# Patient Record
Sex: MALE | Race: WHITE | Employment: OTHER | URBAN - METROPOLITAN AREA
[De-identification: names, ages, dates, MRNs, and addresses within clinical notes are randomized per-mention and may not be internally consistent; named-entity substitution may affect disease eponyms.]

---

## 2020-11-18 ENCOUNTER — TELEPHONE (OUTPATIENT)
Dept: INTERNAL MEDICINE CLINIC | Age: 70
End: 2020-11-18

## 2020-11-18 NOTE — TELEPHONE ENCOUNTER
Incoming fax from [facility] order pharmacy attached. Patient has yet to establish care with , refills will be sent if appropriate at time of New Patient visits 11/19/2020.     Finasteride 5mg    Losartan 50mg    Carvedilol 12.5mg    Furosemide 40mg    Warfarin 5mg

## 2020-11-19 ENCOUNTER — OFFICE VISIT (OUTPATIENT)
Dept: INTERNAL MEDICINE CLINIC | Age: 70
End: 2020-11-19
Payer: MEDICARE

## 2020-11-19 VITALS
OXYGEN SATURATION: 99 % | DIASTOLIC BLOOD PRESSURE: 74 MMHG | HEIGHT: 74 IN | SYSTOLIC BLOOD PRESSURE: 132 MMHG | WEIGHT: 227 LBS | TEMPERATURE: 97.3 F | HEART RATE: 86 BPM | BODY MASS INDEX: 29.13 KG/M2

## 2020-11-19 PROBLEM — I48.20 CHRONIC ATRIAL FIBRILLATION (HCC): Status: ACTIVE | Noted: 2020-11-19

## 2020-11-19 PROBLEM — I10 ESSENTIAL HYPERTENSION: Status: ACTIVE | Noted: 2020-11-19

## 2020-11-19 LAB
INR BLD: 2.11 (ref 0.86–1.14)
PROTHROMBIN TIME: 24.7 SEC (ref 10–13.2)

## 2020-11-19 PROCEDURE — G8417 CALC BMI ABV UP PARAM F/U: HCPCS | Performed by: INTERNAL MEDICINE

## 2020-11-19 PROCEDURE — G8427 DOCREV CUR MEDS BY ELIG CLIN: HCPCS | Performed by: INTERNAL MEDICINE

## 2020-11-19 PROCEDURE — 99203 OFFICE O/P NEW LOW 30 MIN: CPT | Performed by: INTERNAL MEDICINE

## 2020-11-19 PROCEDURE — G8484 FLU IMMUNIZE NO ADMIN: HCPCS | Performed by: INTERNAL MEDICINE

## 2020-11-19 PROCEDURE — 3017F COLORECTAL CA SCREEN DOC REV: CPT | Performed by: INTERNAL MEDICINE

## 2020-11-19 PROCEDURE — 36415 COLL VENOUS BLD VENIPUNCTURE: CPT | Performed by: INTERNAL MEDICINE

## 2020-11-19 PROCEDURE — 1123F ACP DISCUSS/DSCN MKR DOCD: CPT | Performed by: INTERNAL MEDICINE

## 2020-11-19 PROCEDURE — 4040F PNEUMOC VAC/ADMIN/RCVD: CPT | Performed by: INTERNAL MEDICINE

## 2020-11-19 PROCEDURE — G0438 PPPS, INITIAL VISIT: HCPCS | Performed by: INTERNAL MEDICINE

## 2020-11-19 PROCEDURE — 1036F TOBACCO NON-USER: CPT | Performed by: INTERNAL MEDICINE

## 2020-11-19 RX ORDER — LOSARTAN POTASSIUM AND HYDROCHLOROTHIAZIDE 25; 100 MG/1; MG/1
1 TABLET ORAL DAILY
Qty: 90 TABLET | Refills: 0 | Status: SHIPPED | OUTPATIENT
Start: 2020-11-19 | End: 2021-01-14 | Stop reason: SDUPTHER

## 2020-11-19 RX ORDER — CARVEDILOL 25 MG/1
25 TABLET ORAL 2 TIMES DAILY
Qty: 1 TABLET | Refills: 0 | COMMUNITY
Start: 2020-11-19 | End: 2020-11-24 | Stop reason: SDUPTHER

## 2020-11-19 RX ORDER — FUROSEMIDE 40 MG/1
40 TABLET ORAL PRN
COMMUNITY
End: 2020-11-24 | Stop reason: SDUPTHER

## 2020-11-19 RX ORDER — LOSARTAN POTASSIUM 50 MG/1
50 TABLET ORAL DAILY
COMMUNITY
End: 2021-01-14 | Stop reason: ALTCHOICE

## 2020-11-19 RX ORDER — LISINOPRIL 40 MG/1
40 TABLET ORAL DAILY
COMMUNITY
End: 2021-01-14 | Stop reason: ALTCHOICE

## 2020-11-19 RX ORDER — FINASTERIDE 5 MG/1
5 TABLET, FILM COATED ORAL DAILY
COMMUNITY
End: 2020-11-24 | Stop reason: SDUPTHER

## 2020-11-19 RX ORDER — WARFARIN SODIUM 5 MG/1
5 TABLET ORAL DAILY
COMMUNITY
End: 2020-11-24 | Stop reason: SDUPTHER

## 2020-11-19 ASSESSMENT — PATIENT HEALTH QUESTIONNAIRE - PHQ9
SUM OF ALL RESPONSES TO PHQ QUESTIONS 1-9: 0
SUM OF ALL RESPONSES TO PHQ9 QUESTIONS 1 & 2: 0
1. LITTLE INTEREST OR PLEASURE IN DOING THINGS: 0
SUM OF ALL RESPONSES TO PHQ QUESTIONS 1-9: 0
SUM OF ALL RESPONSES TO PHQ QUESTIONS 1-9: 0
2. FEELING DOWN, DEPRESSED OR HOPELESS: 0

## 2020-11-19 ASSESSMENT — LIFESTYLE VARIABLES
HOW OFTEN DURING THE LAST YEAR HAVE YOU BEEN UNABLE TO REMEMBER WHAT HAPPENED THE NIGHT BEFORE BECAUSE YOU HAD BEEN DRINKING: 0
AUDIT TOTAL SCORE: 1
AUDIT-C TOTAL SCORE: 1
HOW MANY STANDARD DRINKS CONTAINING ALCOHOL DO YOU HAVE ON A TYPICAL DAY: 0
HOW OFTEN DO YOU HAVE A DRINK CONTAINING ALCOHOL: 1
HOW OFTEN DURING THE LAST YEAR HAVE YOU FOUND THAT YOU WERE NOT ABLE TO STOP DRINKING ONCE YOU HAD STARTED: 0
HOW OFTEN DURING THE LAST YEAR HAVE YOU NEEDED AN ALCOHOLIC DRINK FIRST THING IN THE MORNING TO GET YOURSELF GOING AFTER A NIGHT OF HEAVY DRINKING: 0
HAS A RELATIVE, FRIEND, DOCTOR, OR ANOTHER HEALTH PROFESSIONAL EXPRESSED CONCERN ABOUT YOUR DRINKING OR SUGGESTED YOU CUT DOWN: 0
HOW OFTEN DO YOU HAVE SIX OR MORE DRINKS ON ONE OCCASION: 0
HAVE YOU OR SOMEONE ELSE BEEN INJURED AS A RESULT OF YOUR DRINKING: 0
HOW OFTEN DURING THE LAST YEAR HAVE YOU HAD A FEELING OF GUILT OR REMORSE AFTER DRINKING: 0
HOW OFTEN DURING THE LAST YEAR HAVE YOU FAILED TO DO WHAT WAS NORMALLY EXPECTED FROM YOU BECAUSE OF DRINKING: 0

## 2020-11-19 NOTE — PROGRESS NOTES
Abril Reyna  YOB: 1950    Date of Service:  11/19/2020    Chief Complaint:      Chief Complaint   Patient presents with   Bobbie Stabs Establish Care  Medicare AWV       HPI:  Abril Reyna is a 69 y.o. Just moved back here from [place] a month ago. Chronic A. Fib:  Stable on Coumadin 5 mg qd with INR 3-3.5  Hypertension:  Home blood pressure monitoring: Yes - 130/70 on Losartan 50 mg qd, Lisinopril 40 mg qd and Coreg 25 mg bid. He is adherent to a low sodium diet. Patient denies chest pain, shortness of breath, headache, lightheadedness, blurred vision, peripheral edema, palpitations and fatigue. Antihypertensive medication side effects: no medication side effects noted. Use of agents associated with hypertension: none. BPH:  Stable on Proscar 5 mg qd  Bilateral leg edema:  Stable on Lasix 40 mg prn once a week                 No results found for: LABA1C, LABMICR  No results found for: NA, K, CL, CO2, BUN, CREATININE, GLUCOSE, CALCIUM  No results found for: CHOL, TRIG, HDL, LDLCALC, LDLDIRECT  No results found for: ALT, AST  No results found for: TSH, T4FREE  No results found for: WBC, HGB, HCT, MCV, PLT  No results found for: INR   No results found for: PSA   No results found for: LABURIC     There is no problem list on file for this patient.       Allergies   Allergen Reactions    Amlodipine      Cough, swelling       Other      All mycin drugs        Outpatient Medications Marked as Taking for the 11/19/20 encounter (Office Visit) with Maranda Ghosh MD   Medication Sig Dispense Refill    finasteride (PROSCAR) 5 MG tablet Take 5 mg by mouth daily      losartan (COZAAR) 50 MG tablet Take 50 mg by mouth daily      warfarin (COUMADIN) 5 MG tablet Take 5 mg by mouth daily      lisinopril (PRINIVIL;ZESTRIL) 40 MG tablet Take 40 mg by mouth daily      CARVEDILOL PO Take 2.5 mg by mouth 2 times daily      furosemide (LASIX) 40 MG tablet Take 40 mg by mouth as needed           Review of Systems: 14 systems were negative except of what was stated on HPI    Nursing note and vitals reviewed. Vitals:    11/19/20 0829   BP: 132/74   Pulse: 86   Temp: 97.3 °F (36.3 °C)   TempSrc: Infrared   SpO2: 99%   Weight: 227 lb (103 kg)   Height: 6' 2\" (1.88 m)     Wt Readings from Last 3 Encounters:   11/19/20 227 lb (103 kg)     BP Readings from Last 3 Encounters:   11/19/20 132/74     Body mass index is 29.15 kg/m². Constitutional: Patient appears well-developed and well-nourished. No distress. Head: Normocephalic and atraumatic. Neck: Normal range of motion. Neck supple. No thyroidmegaly. Cardiovascular: Normal rate, regular rhythm, normal heart sounds and intact distal pulses. Pulmonary/Chest: Effort normal and breath sounds normal. No stridor. No respiratory distress. No wheezes and no rales. Abdominal: Soft. Bowel sounds are normal. No distension and no mass. No tenderness. No rebound and no guarding. Musculoskeletal: No edema and no tenderness. Skin: No rash or erythema. Psychiatric: Normal mood and affect. Behavior is normal.     Assessment/Plan:  Delbra Bloch was seen today for [facility] and medicare aw. Diagnoses and all orders for this visit:    Routine general medical examination at a health care facility    Chronic [facility] fibrillation ([facility]  -     [facility]; Standing    Anticoagulated on Coumadin  -     Protime-INR  -     Protime-INR; Standing    Essential hypertension  Increase  losartan-hydroCHLOROthiazide (HYZAAR) 100-25 MG per tablet; Take 1 tablet by mouth daily  d/c lisinopril 40 mg qd due to slight cough    Return Jan 5 at 1 VV BP check.

## 2020-11-19 NOTE — PATIENT INSTRUCTIONS
Personalized Preventive Plan for Fady Olivares - 11/19/2020  Medicare offers a range of preventive health benefits. Some of the tests and screenings are paid in full while other may be subject to a deductible, co-insurance, and/or copay. Some of these benefits include a comprehensive review of your medical history including lifestyle, illnesses that may run in your family, and various assessments and screenings as appropriate. After reviewing your medical record and screening and assessments performed today your provider may have ordered immunizations, labs, imaging, and/or referrals for you. A list of these orders (if applicable) as well as your Preventive Care list are included within your After Visit Summary for your review. Other Preventive Recommendations:    · A preventive eye exam performed by an eye specialist is recommended every 1-2 years to screen for glaucoma; cataracts, macular degeneration, and other eye disorders. · A preventive dental visit is recommended every 6 months. · Try to get at least 150 minutes of exercise per week or 10,000 steps per day on a pedometer . · Order or download the FREE \"Exercise & Physical Activity: Your Everyday Guide\" from [employer]. Call 9-655.363.2904 or search [employer] online. · You need 2611-3375 mg of calcium and 7906-1739 IU of vitamin D per day. It is possible to meet your calcium requirement with diet alone, but a vitamin D supplement is usually necessary to meet this goal.  · When exposed to the sun, use a sunscreen that protects against both UVA and UVB radiation with an SPF of 30 or greater. Reapply every 2 to 3 hours or after sweating, drying off with a towel, or swimming. · Always wear a seat belt when traveling in a car. Always wear a helmet when riding a bicycle or motorcycle.

## 2020-11-24 RX ORDER — FUROSEMIDE 40 MG/1
40 TABLET ORAL PRN
Qty: 90 TABLET | Refills: 0 | Status: SHIPPED | OUTPATIENT
Start: 2020-11-24

## 2020-11-24 RX ORDER — LOSARTAN POTASSIUM 50 MG/1
50 TABLET ORAL DAILY
Qty: 30 TABLET | OUTPATIENT
Start: 2020-11-24

## 2020-11-24 RX ORDER — WARFARIN SODIUM 5 MG/1
5 TABLET ORAL DAILY
Qty: 90 TABLET | Refills: 0 | Status: SHIPPED | OUTPATIENT
Start: 2020-11-24 | End: 2021-03-24

## 2020-11-24 RX ORDER — FINASTERIDE 5 MG/1
5 TABLET, FILM COATED ORAL DAILY
Qty: 90 TABLET | Refills: 3 | Status: SHIPPED | OUTPATIENT
Start: 2020-11-24 | End: 2021-11-18 | Stop reason: SDUPTHER

## 2020-11-24 RX ORDER — CARVEDILOL 25 MG/1
25 TABLET ORAL 2 TIMES DAILY
Qty: 180 TABLET | Refills: 3 | Status: SHIPPED | OUTPATIENT
Start: 2020-11-24 | End: 2021-11-18 | Stop reason: SDUPTHER

## 2021-01-14 ENCOUNTER — OFFICE VISIT (OUTPATIENT)
Dept: INTERNAL MEDICINE CLINIC | Age: 71
End: 2021-01-14
Payer: MEDICARE

## 2021-01-14 ENCOUNTER — TELEPHONE (OUTPATIENT)
Dept: INTERNAL MEDICINE CLINIC | Age: 71
End: 2021-01-14

## 2021-01-14 VITALS
SYSTOLIC BLOOD PRESSURE: 136 MMHG | WEIGHT: 225.4 LBS | BODY MASS INDEX: 28.93 KG/M2 | HEART RATE: 84 BPM | TEMPERATURE: 97.5 F | DIASTOLIC BLOOD PRESSURE: 80 MMHG | HEIGHT: 74 IN | OXYGEN SATURATION: 98 %

## 2021-01-14 DIAGNOSIS — I10 ESSENTIAL HYPERTENSION: Primary | ICD-10-CM

## 2021-01-14 DIAGNOSIS — M25.561 CHRONIC PAIN OF RIGHT KNEE: ICD-10-CM

## 2021-01-14 DIAGNOSIS — Z51.81 ANTICOAGULATION MANAGEMENT ENCOUNTER: ICD-10-CM

## 2021-01-14 DIAGNOSIS — G89.29 CHRONIC PAIN OF RIGHT KNEE: ICD-10-CM

## 2021-01-14 DIAGNOSIS — I48.20 CHRONIC ATRIAL FIBRILLATION (HCC): ICD-10-CM

## 2021-01-14 DIAGNOSIS — Z79.01 ANTICOAGULATION MANAGEMENT ENCOUNTER: ICD-10-CM

## 2021-01-14 PROCEDURE — 3017F COLORECTAL CA SCREEN DOC REV: CPT | Performed by: INTERNAL MEDICINE

## 2021-01-14 PROCEDURE — 1036F TOBACCO NON-USER: CPT | Performed by: INTERNAL MEDICINE

## 2021-01-14 PROCEDURE — 1123F ACP DISCUSS/DSCN MKR DOCD: CPT | Performed by: INTERNAL MEDICINE

## 2021-01-14 PROCEDURE — G8482 FLU IMMUNIZE ORDER/ADMIN: HCPCS | Performed by: INTERNAL MEDICINE

## 2021-01-14 PROCEDURE — G8417 CALC BMI ABV UP PARAM F/U: HCPCS | Performed by: INTERNAL MEDICINE

## 2021-01-14 PROCEDURE — 4040F PNEUMOC VAC/ADMIN/RCVD: CPT | Performed by: INTERNAL MEDICINE

## 2021-01-14 PROCEDURE — G8427 DOCREV CUR MEDS BY ELIG CLIN: HCPCS | Performed by: INTERNAL MEDICINE

## 2021-01-14 PROCEDURE — 99214 OFFICE O/P EST MOD 30 MIN: CPT | Performed by: INTERNAL MEDICINE

## 2021-01-14 RX ORDER — LOSARTAN POTASSIUM AND HYDROCHLOROTHIAZIDE 25; 100 MG/1; MG/1
1 TABLET ORAL DAILY
Qty: 90 TABLET | Refills: 2 | Status: SHIPPED | OUTPATIENT
Start: 2021-01-14 | End: 2021-11-18 | Stop reason: SDUPTHER

## 2021-01-14 ASSESSMENT — PATIENT HEALTH QUESTIONNAIRE - PHQ9
1. LITTLE INTEREST OR PLEASURE IN DOING THINGS: 0
SUM OF ALL RESPONSES TO PHQ9 QUESTIONS 1 & 2: 0
SUM OF ALL RESPONSES TO PHQ QUESTIONS 1-9: 0
SUM OF ALL RESPONSES TO PHQ QUESTIONS 1-9: 0

## 2021-01-14 NOTE — TELEPHONE ENCOUNTER
OUTGOING RECORDS REQUESTS ATTACHED    Requests for continuation of care sent to:    [facility]- Lino Darby (PCP)    [facility]  (eye exam)    [facility]- Dr. Daquan Garcia (cardiology)    See attached document with address & fax numbers

## 2021-01-14 NOTE — PROGRESS NOTES
Tuyet Gordon  YOB: 1950    Date of Service:  1/14/2021    Chief Complaint:      Chief Complaint   Patient presents with    Hypertension       HPI:  Tuyet Gordon is a 69 y.o. Chronic A. Fib:  Stable on Coumadin 5 mg qd with INR 2.5  Hypertension:  Home blood pressure monitoring: Yes - 130/70 on Losartan/hctz 100/25 mg qd and Coreg 25 mg bid. He is adherent to a low sodium diet. Patient denies chest pain, shortness of breath, headache, lightheadedness, blurred vision, peripheral edema, palpitations and fatigue. Antihypertensive medication side effects: no medication side effects noted. Use of agents associated with hypertension: none. BPH:  Stable on Proscar 5 mg qd  Bilateral leg edema:  Stable on Lasix 40 mg prn once a week    No results found for: LABA1C, LABMICR  No results found for: NA, K, CL, CO2, BUN, CREATININE, GLUCOSE, CALCIUM  No results found for: CHOL, TRIG, HDL, LDLCALC, LDLDIRECT  No results found for: ALT, AST  No results found for: TSH, T4FREE  No results found for: WBC, HGB, HCT, MCV, PLT  Lab Results   Component Value Date    INR 2.11 (H) 11/19/2020      No results found for: PSA   No results found for: [address]     Patient Active Problem List   Diagnosis    Chronic atrial fibrillation ([facility]    Essential hypertension       Allergies   Allergen Reactions    Amlodipine      Cough, swelling       Other      All mycin drugs        No outpatient medications have been marked as taking for the 1/14/21 encounter (Office Visit) with Martin Berkowitz MD.         Review of Systems: 14 systems were negative except of what was stated on HPI    Nursing note and vitals reviewed.     Vitals:    01/14/21 0819   BP: 136/80   Pulse: 84   Temp: 97.5 °F (36.4 °C)   TempSrc: Infrared   SpO2: 98%   Weight: 225 lb 6.4 oz (102.2 kg)   Height: 6' 2\" (1.88 m)     Wt Readings from Last 3 Encounters:   01/14/21 225 lb 6.4 oz (102.2 kg)   11/19/20 227 lb (103 kg) BP Readings from Last 3 Encounters:   01/14/21 136/80   11/19/20 132/74     Body mass index is 28.94 kg/m². Constitutional: Patient appears well-developed and well-nourished. No distress. Head: Normocephalic and atraumatic. Neck: Normal range of motion. Neck supple. No thyroidmegaly. Cardiovascular: Normal rate, regular rhythm, normal heart sounds and intact distal pulses. Pulmonary/Chest: Effort normal and breath sounds normal. No stridor. No respiratory distress. No wheezes and no rales. Abdominal: Soft. Bowel sounds are normal. No distension and no mass. No tenderness. No rebound and no guarding. Musculoskeletal: No edema and no tenderness. Skin: No rash or erythema. Psychiatric: Normal mood and affect. Behavior is normal.     Assessment/Plan:  Keeley Jasmine was seen today for hypertension. Diagnoses and all orders for this visit:    Essential hypertension  -     losartan-hydroCHLOROthiazide (HYZAAR) 100-25 MG per tablet; Take 1 tablet by mouth daily    Chronic atrial fibrillation ([address]  -     Ambulatory referral to Cardiology  -     Cancel: [place]; Future  Will defer further management to his new cardiologist    Anticoagulation management encounter  -     Protime-INR; Future    Chronic pain of right knee  -     Polly Ramirez MD, Orthopedic Surgery, [facility]        Return Nov 22 at 9:50 Fasting Medicare Wellness and f/u.

## 2021-01-20 ENCOUNTER — HOSPITAL ENCOUNTER (OUTPATIENT)
Age: 71
Discharge: HOME | End: 2021-01-20
Payer: MEDICARE

## 2021-01-20 DIAGNOSIS — Z79.01 ANTICOAGULATION MANAGEMENT ENCOUNTER: ICD-10-CM

## 2021-01-20 DIAGNOSIS — I48.20 CHRONIC ATRIAL FIBRILLATION (HCC): ICD-10-CM

## 2021-01-20 DIAGNOSIS — Z51.81 ANTICOAGULATION MANAGEMENT ENCOUNTER: ICD-10-CM

## 2021-01-20 LAB
INR BLD: 2.16 (ref 0.86–1.14)
PROTHROMBIN TIME: 25.2 SEC (ref 10–13.2)

## 2021-01-20 PROCEDURE — 85610 PROTHROMBIN TIME: CPT

## 2021-01-20 PROCEDURE — 36415 COLL VENOUS BLD VENIPUNCTURE: CPT

## 2021-01-25 NOTE — TELEPHONE ENCOUNTER
Incoming records received from the following so far:    [facility]    Cardiology

## 2021-01-25 NOTE — PROGRESS NOTES
[facility], [place], operated by [facility]   CARDIAC EVALUATION NOTE  [phone]      PCP:  Belen Farrell MD    Reason for Consultation/Chief Complaint: new patient for afib     Subjective   History of Present Illness:  Emerson Miller is a 69 y.o. patient with a history of afib and HTN who presents to establish with cardiology for Afib. He has recently moved back to [facility] from [place]. He plans to go back to [place] during winter. He denies CAD, DM, or HLD. He states his INR goal 2.5-3.0. He was previously on pradaxa but was too expensive. He reports while visiting [place] in 2015 he was very fatigued. He had stress tests and echos at that time. He reports he was advised against a cardioversion to maintain a strategy of rate control/anticoag (and to accept perm afib). His docs had advised INR 2.5-3, and some have said 2.5-3.5 however he states he never had issues w/ coumadin at lower INRs with regard to cardioembolic events. He is able to complete tasks around the house without limitations. He denies CP, SOB, dizziness or syncope. He was started on diuretic but has not needed it. Past Medical History:   has a past medical history of Atrial fibrillation ([address] and Hypertension. Surgical History:   has a past surgical history that includes Facial Surgery and Knee cartilage surgery (Right). Social History:   reports that he has never smoked. He has never used smokeless tobacco. He reports current alcohol use. He reports that he does not use drugs. Family History:  family history includes Cancer in his maternal uncle; Other in his father. Home Medications:  Were reviewed and are listed in nursing record and/or below  Prior to Admission medications    Medication Sig Start Date End Date Taking?  Authorizing Provider   losartan-hydroCHLOROthiazide (HYZAAR) 100-25 MG per tablet Take 1 tablet by mouth daily 1/14/21  Yes Valerie Perdomo MD furosemide (LASIX) 40 MG tablet Take 1 tablet by mouth as needed (swelling) 11/24/20  Yes Pia Perdomo MD   warfarin (COUMADIN) 5 MG tablet Take 1 tablet by mouth daily 11/24/20  Yes Nadine Perdomo MD   finasteride (PROSCAR) 5 MG tablet Take 1 tablet by mouth daily 11/24/20  Yes Nadine Perdomo MD   carvedilol (COREG) 25 MG tablet Take 1 tablet by mouth 2 times daily 11/24/20  Yes Pia Perdomo MD          Allergies:  Amlodipine and Other     Review of Systems:   A 14 point review of symptoms completed. Pertinent positives identified in the HPI, all other review of symptoms negative as below. Objective   PHYSICAL EXAM:    Vitals:    01/26/21 0855   BP: 118/84   Pulse:    SpO2:     Weight: 228 lb (103.4 kg)         General Appearance:  Alert, cooperative, no distress, appears stated age   Head:  Normocephalic, without obvious abnormality, atraumatic   Eyes:  PERRL, conjunctiva/corneas clear   Nose: Nares normal, no drainage or sinus tenderness   Throat: Not able to examine due to mask     Neck: Supple, symmetrical, trachea midline, no adenopathy, thyroid: not enlarged, symmetric, no tenderness/mass/nodules, no carotid bruit or JVD   Lungs:   Clear to auscultation bilaterally, respirations unlabored   Chest Wall:  No deformity or tenderness   Heart:  irregular rate and rhythm, S1, S2 normal, no murmur, rub or gallop   Abdomen:   Soft, non-tender, bowel sounds active all four quadrants,  no masses, no organomegaly   Extremities: Extremities normal, atraumatic, no cyanosis.  +1 BLE edema   Pulses: 2+ and symmetric   Skin: Skin color, texture, turgor normal, no rashes or lesions   Pysch: Normal mood and affect   Neurologic: Normal gross motor and sensory exam.         Labs   CBC: No results found for: WBC, RBC, HGB, HCT, MCV, RDW, PLT  CMP:No results found for: NA, K, CL, CO2, BUN, CREATININE, GFRAA, AGRATIO, LABGLOM, GLUCOSE, PROT, CALCIUM, BILITOT, ALKPHOS, AST, ALT  PT/INR:  No results found for: [employer] HgBA1c:No results found for: LABA1C  No results found for: CKTOTAL, CKMB, CKMBINDEX, TROPONINI      Cardiac Data     Last EK21   afib HR 63    Echo:    Stress Test:    Cath:    Studies:       I have reviewed labs and imaging/xray/diagnostic testing in this note. Assessment      1. Atrial fibrillation, unspecified type ([address]    2. Chronic atrial fibrillation (HCC)    3. Essential hypertension                 Plan   1. Discussed Watchman procedure for an alternative to coumadin therapy  2. Will refer to the [address], goal INR 2-3  3. Echo to evaluate atrial fib and atrial size  4. Liver and lipids   5. Follow up in 1 year      Scribe's attestation: This note was scribed in the presence of Dr. Avel Baltazar by Raj Us RN      Thank you for allowing us to participate in the care of Marquise Acosta. Please call me with any questions [phone]. Avel Baltazar MD, [facility]   Interventional Cardiologist  [facility]  [phone] [facility]  [phone] [address] [place] [address]  2021 9:36 AM    I will address the patient's cardiac risk factors and adjusted pharmacologic treatment as needed. In addition, I have reinforced the need for patient directed risk factor modification. Tobacco use was discussed with the patient and educated on the negative effects and was asked not to use. All questions and concerns were addressed to the patient/family. Alternatives to my treatment were discussed. I, Dr Avel Baltazar, personally performed the services described in this documentation, as scribed by the above signed scribe in my presence. It is both accurate and complete to my knowledge. I agree with the details independently gathered by the clinical support staff and the scribed note accurately describes my personal service to the patient.

## 2021-01-26 ENCOUNTER — OFFICE VISIT (OUTPATIENT)
Dept: CARDIOLOGY CLINIC | Age: 71
End: 2021-01-26
Payer: MEDICARE

## 2021-01-26 VITALS
BODY MASS INDEX: 29.26 KG/M2 | OXYGEN SATURATION: 99 % | HEIGHT: 74 IN | HEART RATE: 62 BPM | SYSTOLIC BLOOD PRESSURE: 118 MMHG | WEIGHT: 228 LBS | DIASTOLIC BLOOD PRESSURE: 84 MMHG

## 2021-01-26 DIAGNOSIS — R06.02 SOB (SHORTNESS OF BREATH): ICD-10-CM

## 2021-01-26 DIAGNOSIS — E78.2 MIXED HYPERLIPIDEMIA: ICD-10-CM

## 2021-01-26 DIAGNOSIS — I48.91 ATRIAL FIBRILLATION, UNSPECIFIED TYPE (HCC): Primary | ICD-10-CM

## 2021-01-26 DIAGNOSIS — I10 ESSENTIAL HYPERTENSION: ICD-10-CM

## 2021-01-26 DIAGNOSIS — I48.20 CHRONIC ATRIAL FIBRILLATION (HCC): ICD-10-CM

## 2021-01-26 PROCEDURE — 3017F COLORECTAL CA SCREEN DOC REV: CPT | Performed by: INTERNAL MEDICINE

## 2021-01-26 PROCEDURE — G8427 DOCREV CUR MEDS BY ELIG CLIN: HCPCS | Performed by: INTERNAL MEDICINE

## 2021-01-26 PROCEDURE — 1123F ACP DISCUSS/DSCN MKR DOCD: CPT | Performed by: INTERNAL MEDICINE

## 2021-01-26 PROCEDURE — 99204 OFFICE O/P NEW MOD 45 MIN: CPT | Performed by: INTERNAL MEDICINE

## 2021-01-26 PROCEDURE — 1036F TOBACCO NON-USER: CPT | Performed by: INTERNAL MEDICINE

## 2021-01-26 PROCEDURE — G8482 FLU IMMUNIZE ORDER/ADMIN: HCPCS | Performed by: INTERNAL MEDICINE

## 2021-01-26 PROCEDURE — 4040F PNEUMOC VAC/ADMIN/RCVD: CPT | Performed by: INTERNAL MEDICINE

## 2021-01-26 PROCEDURE — 93000 ELECTROCARDIOGRAM COMPLETE: CPT | Performed by: INTERNAL MEDICINE

## 2021-01-26 PROCEDURE — G8417 CALC BMI ABV UP PARAM F/U: HCPCS | Performed by: INTERNAL MEDICINE

## 2021-01-26 NOTE — PATIENT INSTRUCTIONS
1. Discussed Watchman procedure for an alternative to coumadin therapy  [facility]  3. Echo to evaluate atrial fib and atrial size  4. Liver and lipids   5. Follow up in 1 year      Your provider has ordered testing for further evaluation. An order/prescription has been included in your paper work. ? To schedule outpatient testing, contact Central Scheduling by calling [facility] ([phone]).

## 2021-02-05 ENCOUNTER — ANTI-COAG VISIT (OUTPATIENT)
Dept: PHARMACY | Age: 71
End: 2021-02-05
Payer: MEDICARE

## 2021-02-05 ENCOUNTER — OFFICE VISIT (OUTPATIENT)
Dept: ORTHOPEDIC SURGERY | Age: 71
End: 2021-02-05
Payer: MEDICARE

## 2021-02-05 VITALS
HEART RATE: 62 BPM | BODY MASS INDEX: 28.88 KG/M2 | HEIGHT: 74 IN | WEIGHT: 225 LBS | DIASTOLIC BLOOD PRESSURE: 90 MMHG | SYSTOLIC BLOOD PRESSURE: 140 MMHG

## 2021-02-05 DIAGNOSIS — M25.561 RIGHT KNEE PAIN, UNSPECIFIED CHRONICITY: Primary | ICD-10-CM

## 2021-02-05 DIAGNOSIS — M17.11 PRIMARY OSTEOARTHRITIS OF RIGHT KNEE: ICD-10-CM

## 2021-02-05 LAB — INTERNATIONAL NORMALIZATION RATIO, POC: 2.1

## 2021-02-05 PROCEDURE — 85610 PROTHROMBIN TIME: CPT

## 2021-02-05 PROCEDURE — 99203 OFFICE O/P NEW LOW 30 MIN: CPT | Performed by: ORTHOPAEDIC SURGERY

## 2021-02-05 PROCEDURE — 3017F COLORECTAL CA SCREEN DOC REV: CPT | Performed by: ORTHOPAEDIC SURGERY

## 2021-02-05 PROCEDURE — G8417 CALC BMI ABV UP PARAM F/U: HCPCS | Performed by: ORTHOPAEDIC SURGERY

## 2021-02-05 PROCEDURE — G8427 DOCREV CUR MEDS BY ELIG CLIN: HCPCS | Performed by: ORTHOPAEDIC SURGERY

## 2021-02-05 PROCEDURE — 1123F ACP DISCUSS/DSCN MKR DOCD: CPT | Performed by: ORTHOPAEDIC SURGERY

## 2021-02-05 PROCEDURE — 1036F TOBACCO NON-USER: CPT | Performed by: ORTHOPAEDIC SURGERY

## 2021-02-05 PROCEDURE — 4040F PNEUMOC VAC/ADMIN/RCVD: CPT | Performed by: ORTHOPAEDIC SURGERY

## 2021-02-05 PROCEDURE — 20610 DRAIN/INJ JOINT/BURSA W/O US: CPT | Performed by: ORTHOPAEDIC SURGERY

## 2021-02-05 PROCEDURE — G8482 FLU IMMUNIZE ORDER/ADMIN: HCPCS | Performed by: ORTHOPAEDIC SURGERY

## 2021-02-05 PROCEDURE — 99202 OFFICE O/P NEW SF 15 MIN: CPT

## 2021-02-05 RX ORDER — BETAMETHASONE SODIUM PHOSPHATE AND BETAMETHASONE ACETATE 3; 3 MG/ML; MG/ML
12 INJECTION, SUSPENSION INTRA-ARTICULAR; INTRALESIONAL; INTRAMUSCULAR; SOFT TISSUE ONCE
Status: COMPLETED | OUTPATIENT
Start: 2021-02-05 | End: 2021-02-05

## 2021-02-05 RX ORDER — LIDOCAINE HYDROCHLORIDE 10 MG/ML
5 INJECTION, SOLUTION INFILTRATION; PERINEURAL ONCE
Status: COMPLETED | OUTPATIENT
Start: 2021-02-05 | End: 2021-02-05

## 2021-02-05 RX ADMIN — BETAMETHASONE SODIUM PHOSPHATE AND BETAMETHASONE ACETATE 12 MG: 3; 3 INJECTION, SUSPENSION INTRA-ARTICULAR; INTRALESIONAL; INTRAMUSCULAR; SOFT TISSUE at 08:42

## 2021-02-05 RX ADMIN — LIDOCAINE HYDROCHLORIDE 5 ML: 10 INJECTION, SOLUTION INFILTRATION; PERINEURAL at 08:43

## 2021-02-05 NOTE — PROGRESS NOTES
Date of Encounter: 2/5/2021  Patient [address] Record Number: [id number]    Chief Complaint   Patient presents with    Knee Problem     Right knee       History of Present Illness:  Lucian Christiansen is a 69 y.o. male here for evaluation of his right knee. His current symptoms are described below and I reviewed them with him today. His main issue is stiffness in the knee as well as worsening pain over the past 6 to 8 months. He has had previous arthroscopy in the 1970s. He is also had at least 2 cortisone injections from his physician in [place] over the last 5 years. Reports the first injection worked for over a year with a more recent injection lasting just a few months. [place] is now in [place] full-time. He tries to keep active and perform exercises for his knee but finds that at times he gets stiff and more painful from the exercise. No numbness or tingling that radiates down the leg. No recent falls or direct trauma. He is on warfarin for atrial fibrillation.      Pain Assessment  Location of Pain: Knee  Location Modifiers: Right  Severity of Pain: 8  Quality of Pain: Aching  Duration of Pain: Persistent  Frequency of Pain: Constant  Date Pain First Started: 08/17/20  Aggravating Factors: Bending  Limiting Behavior: Yes  Relieving Factors: Rest  Result of Injury: No  Work-Related Injury: No  Are there other pain locations you wish to document?: No    Past Medical History:   Diagnosis Date    Atrial fibrillation ([address]     Hypertension        Past Surgical History:   Procedure Laterality Date    FACIAL SURGERY      KNEE CARTILAGE SURGERY Right        Current Outpatient Medications   Medication Sig Dispense Refill    losartan-hydroCHLOROthiazide (HYZAAR) 100-25 MG per tablet Take 1 tablet by mouth daily 90 tablet 2    furosemide (LASIX) 40 MG tablet Take 1 tablet by mouth as needed (swelling) 90 tablet 0    warfarin (COUMADIN) 5 MG tablet Take 1 tablet by mouth daily 90 tablet 0  finasteride (PROSCAR) 5 MG tablet Take 1 tablet by mouth daily 90 tablet 3    carvedilol (COREG) 25 MG tablet Take 1 tablet by mouth 2 times daily 180 tablet 3     No current facility-administered medications for this visit. allergies, social and family histories were reviewed and updated as appropriate. Review of Systems:  Relevant review of systems reviewed and available in the patient's chart and scanned in under the MEDIA tab today. Vital Signs:  BP (!) 140/90   Pulse 62   Ht 6' 2\" (1.88 m)   Wt 225 lb (102.1 kg)   BMI 28.89 kg/m²     General Exam:   Constitutional: He is adequately groomed with no evidence of malnutrition, obesity absent  Mental Status: He is oriented to time, place and person. Normal mentation and affect for age. Lymphatic: The lymphatic examination bilaterally reveals all areas to be without enlargement or induration. Vascular: Examination reveals no swelling or calf tenderness. Neurological: He has good coordination and balance. There is no focal weakness or sensory deficit. Right Knee Examination:    Inspection:  Knee shows neutral alignment  Normal muscle bulk and tone for his age and activity level. No erythema. There is mild effusion effusion. Palpation: Mild to moderate tenderness palpation along the joint line medially and laterally. Range of Motion: 5 to 95 with mild pain at the extremes of motion. Comparison motion of his left knee to 2-110. Strength:  Quad 4/ 5  ; Hamstrings 4/ 5. Gross motor to hip and ankle intact, no pain with logroll the hip. Stinchfield examination negative. Special Tests:     ? negative anterior drawer   ? no posterior sag   ? no posterior drawer  ? does not open to valgus or varus stress at 0 or 30°   ? negative Homans   ? Capillary refill is brisk sensation is intact. Skin: There are no rashes, ulcerations or lesions. Gait: Tandem gait with decreased stride length and lack of terminal knee extension resulting in mild limp favoring his right side especially on initiation of gait. Radiology:     X-rays obtained today and reviewed in office:  Views 4 Right  Knee with comparison AP, flexion PA and skyline views of the left knee  Impression No evidence for acute fracture or subluxation / dislocation. Both knees show tricompartmental degenerative changes with the right knee showing more severe joint space narrowing. Calcific changes within the joint space also consistent with possible calcium deposition disease can attributing to his arthritic change. No lytic or blastic lesions within the metaphyseal regions of either knee. Impression:  Encounter Diagnoses   Name Primary?  Right knee pain, unspecified chronicity Yes    Primary osteoarthritis of right knee        Office Procedures:  Orders Placed This Encounter   Procedures    XR KNEE RIGHT (MIN 4 VIEWS)     Standing Status:   Future     Number of Occurrences:   1     Standing Expiration Date:   3/3/2021     Scheduling Instructions:      A1    AL ARTHROCENTESIS ASPIR&/INJ MAJOR JT/BURSA W/O US       Treatment Plan:   We discussed treatment options. We reviewed the progression of his arthritis and symptoms over the years as well as possible current and endpoint treatments. He understands that his arthritis will not significantly improve long-term with an injection but would prefer to repeat cortisone injection today. He states that when the pain gets bad enough that is there every day he would consider total knee arthroplasty. He has had friends as well as his wife undergo previous knee replacement and he has an idea of how bad he would like to have his symptoms affect his activities prior to being ready for surgery. I did review with him the fact that range of motion after knee replacement oftentimes mimics the range of motion prior and that any improvements based on his overall level of reduced motion currently would be icing on the cake after surgery. We reviewed the risks and benefits of cortisone injection and he agreed to receive this today. Right knee was prepped with Betadine and 2 cc of betamethasone mixed with 5 cc 1% lidocaine plain were carefully aspirated injected under aseptic technique. He tolerated this well. At this point he will continue with his home exercise activities and follow back with me on an as-needed basis.     Tuyet Gordon understands and accepts this course of care

## 2021-03-04 ENCOUNTER — HOSPITAL ENCOUNTER (OUTPATIENT)
Dept: CARDIOLOGY | Age: 71
Discharge: HOME | End: 2021-03-04
Payer: MEDICARE

## 2021-03-04 DIAGNOSIS — R06.02 SOB (SHORTNESS OF BREATH): ICD-10-CM

## 2021-03-04 DIAGNOSIS — I48.20 CHRONIC ATRIAL FIBRILLATION (HCC): ICD-10-CM

## 2021-03-04 LAB
LV EF: 55 %
LVEF MODALITY: NORMAL

## 2021-03-04 PROCEDURE — 93306 TTE W/DOPPLER COMPLETE: CPT

## 2021-03-05 ENCOUNTER — TELEPHONE (OUTPATIENT)
Dept: CARDIOLOGY CLINIC | Age: 71
End: 2021-03-05

## 2021-03-05 ENCOUNTER — ANTI-COAG VISIT (OUTPATIENT)
Dept: PHARMACY | Age: 71
End: 2021-03-05
Payer: MEDICARE

## 2021-03-05 ENCOUNTER — HOSPITAL ENCOUNTER (OUTPATIENT)
Age: 71
Discharge: HOME | End: 2021-03-05
Payer: MEDICARE

## 2021-03-05 DIAGNOSIS — E78.2 MIXED HYPERLIPIDEMIA: ICD-10-CM

## 2021-03-05 LAB
ALBUMIN SERPL-MCNC: 4 G/DL (ref 3.4–5)
ALP BLD-CCNC: 61 U/L (ref 40–129)
ALT SERPL-CCNC: 19 U/L (ref 10–40)
AST SERPL-CCNC: 20 U/L (ref 15–37)
BILIRUB SERPL-MCNC: 0.8 MG/DL (ref 0–1)
BILIRUBIN DIRECT: <0.2 MG/DL (ref 0–0.3)
BILIRUBIN, INDIRECT: NORMAL MG/DL (ref 0–1)
CHOLESTEROL, TOTAL: 166 MG/DL (ref 0–199)
HDLC SERPL-MCNC: 30 MG/DL (ref 40–60)
INR BLD: 2.8
LDL CHOLESTEROL CALCULATED: 95 MG/DL
TOTAL PROTEIN: 7 G/DL (ref 6.4–8.2)
TRIGL SERPL-MCNC: 206 MG/DL (ref 0–150)
VLDLC SERPL CALC-MCNC: 41 MG/DL

## 2021-03-05 PROCEDURE — 85610 PROTHROMBIN TIME: CPT | Performed by: PHARMACIST

## 2021-03-05 PROCEDURE — 80076 HEPATIC FUNCTION PANEL: CPT

## 2021-03-05 PROCEDURE — 36415 COLL VENOUS BLD VENIPUNCTURE: CPT

## 2021-03-05 PROCEDURE — 99211 OFF/OP EST MAY X REQ PHY/QHP: CPT | Performed by: PHARMACIST

## 2021-03-05 PROCEDURE — 80061 LIPID PANEL: CPT

## 2021-03-05 NOTE — PROGRESS NOTES
Mr. Beatrice Mclaughlin is here for management of anticoagulation for Afib. PMH also significant for HTN, BPH. He presents today w/out complaint. Pt verifies dosing regimen as listed above. Pt denies s/s bleeding/bruising/swelling/SOB. No BRBPR. No melena. Address missed doses  Reviewed pt medication list  No changes in RX/OTCs/Herbal medications. Reviewed dietary concerns  Address EToH and tobacco use. Been on warfarin for 4-5 years    Pt takes in the AM.     No changes    INR 2.8 is within therapeutic range of 2-3  Recommend to continue with 5 mg daily  Pt has 5mg tablets   Will continue to monitor and check INR in 4 weeks. Dosing reminder card given with phone number, appointment date and time. Return to clinic: 4/2 8:15 am    Dmitry Gillis   PharmD Candidate   3/5/2021 8:23 AM    I have seen the patient and reviewed the progress note written by the PharmD Candidate. I agree with this assessment and plan.    Cuba DuranD 3/5/2021 8:35 AM

## 2021-03-05 NOTE — TELEPHONE ENCOUNTER
Created telephone encounter. Per Pt HIPAA from can leave results on machine. LMOM relaying message per [facility] regarding echo results. Pt to call the office with any concerns.

## 2021-03-05 NOTE — TELEPHONE ENCOUNTER
----- Message from Bindu Betts MD sent at 3/4/2021  4:22 PM EST -----  Let patient know echo test shows normal heart function of main pumping chamber, no new orders or changes at this time. Thanks.

## 2021-03-08 ENCOUNTER — TELEPHONE (OUTPATIENT)
Dept: CARDIOLOGY CLINIC | Age: 71
End: 2021-03-08

## 2021-03-08 DIAGNOSIS — I48.20 CHRONIC ATRIAL FIBRILLATION (HCC): ICD-10-CM

## 2021-03-08 DIAGNOSIS — I10 ESSENTIAL HYPERTENSION: Primary | ICD-10-CM

## 2021-03-08 RX ORDER — CHLORAL HYDRATE 500 MG
2000 CAPSULE ORAL DAILY
COMMUNITY
End: 2021-03-08 | Stop reason: SDUPTHER

## 2021-03-08 RX ORDER — CHLORAL HYDRATE 500 MG
2000 CAPSULE ORAL DAILY
Qty: 90 CAPSULE | Refills: 3 | Status: SHIPPED | OUTPATIENT
Start: 2021-03-08

## 2021-03-08 NOTE — TELEPHONE ENCOUNTER
LMOVM for pt to call us regarding lab results and medication changes. Fish oil has been sent to pharmacy and lab orders been put in epic. Per SR[facility] lipd test showed high triglycerides.     TY

## 2021-03-08 NOTE — TELEPHONE ENCOUNTER
Created [facility] encounter. [facility] call back to the office will relay lab results once we get in contact with pt. Lab orders have been placed in epic.

## 2021-03-08 NOTE — TELEPHONE ENCOUNTER
----- Message from Trice Merlos MD sent at 3/5/2021  4:40 PM EST -----  Let patient know their lipid test shows high triglycerides, add fish oil 2 g p.o. daily, get follow-up lipids/LFTs in 1 to 2 months. .  Thanks.

## 2021-03-09 ENCOUNTER — TELEPHONE (OUTPATIENT)
Dept: CARDIOLOGY CLINIC | Age: 71
End: 2021-03-09

## 2021-03-24 RX ORDER — WARFARIN SODIUM 5 MG/1
TABLET ORAL
Qty: 90 TABLET | Refills: 3 | Status: SHIPPED | OUTPATIENT
Start: 2021-03-24 | End: 2021-11-08

## 2021-03-24 NOTE — TELEPHONE ENCOUNTER
Routed to wrong provider. Patient established with coumadin clinic. Unable to find [facility] clinic pool to route. Routing to last provider at coumadin clinic appt.

## 2021-04-02 ENCOUNTER — ANTI-COAG VISIT (OUTPATIENT)
Dept: PHARMACY | Age: 71
End: 2021-04-02
Payer: MEDICARE

## 2021-04-02 LAB — INTERNATIONAL NORMALIZATION RATIO, POC: 3.1

## 2021-04-02 PROCEDURE — 99211 OFF/OP EST MAY X REQ PHY/QHP: CPT

## 2021-04-02 PROCEDURE — 85610 PROTHROMBIN TIME: CPT

## 2021-04-02 NOTE — PROGRESS NOTES
Mr. Vilma Stark is here for management of anticoagulation for Afib. PMH also significant for HTN, BPH. He presents today w/out complaint. Pt verifies dosing regimen as listed above. Pt denies s/s bleeding/bruising/swelling/SOB. No BRBPR. No melena. Address missed doses  Reviewed pt medication list  No changes in RX/OTCs/Herbal medications. Reviewed dietary concerns  Address EToH and tobacco use. Been on warfarin for 4-5 years    Pt takes in the AM.     Taking less salad this week, which would explain the slightly elevated INR today    INR 3.1 is slightly above therapeutic range of 2-3  Recommend to take warfarin 2.5 mg tomorrow only and to continue with 5 mg daily  Pt has 5mg tablets   Will continue to monitor and check INR in 4 weeks. Dosing reminder card given with phone number, appointment date and time.    Return to clinic: 4/30 8:00 am  Referring Physician: Chely Mohan PharmD  4/2/2021 at 8:22 AM

## 2021-04-30 ENCOUNTER — ANTI-COAG VISIT (OUTPATIENT)
Dept: PHARMACY | Age: 71
End: 2021-04-30
Payer: MEDICARE

## 2021-04-30 LAB — INR BLD: 2.4

## 2021-04-30 PROCEDURE — 85610 PROTHROMBIN TIME: CPT

## 2021-04-30 PROCEDURE — 99211 OFF/OP EST MAY X REQ PHY/QHP: CPT

## 2021-05-19 ENCOUNTER — OFFICE VISIT (OUTPATIENT)
Dept: INTERNAL MEDICINE CLINIC | Age: 71
End: 2021-05-19
Payer: MEDICARE

## 2021-05-19 VITALS
WEIGHT: 236 LBS | BODY MASS INDEX: 30.29 KG/M2 | OXYGEN SATURATION: 98 % | DIASTOLIC BLOOD PRESSURE: 72 MMHG | SYSTOLIC BLOOD PRESSURE: 114 MMHG | HEIGHT: 74 IN | HEART RATE: 63 BPM

## 2021-05-19 DIAGNOSIS — L72.3 SEBACEOUS CYST: Primary | ICD-10-CM

## 2021-05-19 PROCEDURE — 1123F ACP DISCUSS/DSCN MKR DOCD: CPT | Performed by: INTERNAL MEDICINE

## 2021-05-19 PROCEDURE — 99213 OFFICE O/P EST LOW 20 MIN: CPT | Performed by: INTERNAL MEDICINE

## 2021-05-19 PROCEDURE — 4040F PNEUMOC VAC/ADMIN/RCVD: CPT | Performed by: INTERNAL MEDICINE

## 2021-05-19 PROCEDURE — G8427 DOCREV CUR MEDS BY ELIG CLIN: HCPCS | Performed by: INTERNAL MEDICINE

## 2021-05-19 PROCEDURE — 3017F COLORECTAL CA SCREEN DOC REV: CPT | Performed by: INTERNAL MEDICINE

## 2021-05-19 PROCEDURE — 1036F TOBACCO NON-USER: CPT | Performed by: INTERNAL MEDICINE

## 2021-05-19 PROCEDURE — G8417 CALC BMI ABV UP PARAM F/U: HCPCS | Performed by: INTERNAL MEDICINE

## 2021-05-19 RX ORDER — CEPHALEXIN 500 MG/1
500 CAPSULE ORAL 4 TIMES DAILY
Qty: 40 CAPSULE | Refills: 0 | Status: SHIPPED | OUTPATIENT
Start: 2021-05-19 | End: 2021-06-08

## 2021-05-19 NOTE — PROGRESS NOTES
Height: 6' 2\" (1.88 m)     Wt Readings from Last 3 Encounters:   05/19/21 236 lb (107 kg)   02/05/21 225 lb (102.1 kg)   01/26/21 228 lb (103.4 kg)     BP Readings from Last 3 Encounters:   05/19/21 114/72   02/05/21 (!) 140/90   01/26/21 118/84     Body mass index is 30.3 kg/m². Constitutional: Patient appears well-developed and well-nourished. No distress. Head: Normocephalic and atraumatic. Neck: Normal range of motion. Neck supple. No thyroidmegaly. Cardiovascular: Normal rate, regular rhythm, normal heart sounds and intact distal pulses. Pulmonary/Chest: Effort normal and breath sounds normal. No stridor. No respiratory distress. No wheezes and no rales. Abdominal: Soft. Bowel sounds are normal. No distension and no mass. No tenderness. No rebound and no guarding. Musculoskeletal: No edema and no tenderness. Skin: No rash or erythema. Psychiatric: Normal mood and affect. Behavior is normal.   Movable cyst back of head/neck region with center yellowish vesicle about to open up. Assessment/Plan:  Alysha Galeana was seen today for cyst.    Diagnoses and all orders for this visit:    Sebaceous cyst  -     cephALEXin (KEFLEX) 500 MG capsule; Take 1 capsule by mouth 4 times daily  -     Hui Yuan MD, General Surgery, [facility]        Return if symptoms worsen or fail to improve.

## 2021-05-24 ENCOUNTER — INITIAL CONSULT (OUTPATIENT)
Dept: SURGERY | Age: 71
End: 2021-05-24
Payer: MEDICARE

## 2021-05-24 VITALS
HEIGHT: 74 IN | WEIGHT: 236 LBS | DIASTOLIC BLOOD PRESSURE: 84 MMHG | BODY MASS INDEX: 30.29 KG/M2 | SYSTOLIC BLOOD PRESSURE: 130 MMHG

## 2021-05-24 DIAGNOSIS — L72.3 INFECTED SEBACEOUS CYST: Primary | ICD-10-CM

## 2021-05-24 DIAGNOSIS — L08.9 INFECTED SEBACEOUS CYST: Primary | ICD-10-CM

## 2021-05-24 PROCEDURE — 99202 OFFICE O/P NEW SF 15 MIN: CPT | Performed by: SURGERY

## 2021-05-24 PROCEDURE — 4040F PNEUMOC VAC/ADMIN/RCVD: CPT | Performed by: SURGERY

## 2021-05-24 PROCEDURE — 1036F TOBACCO NON-USER: CPT | Performed by: SURGERY

## 2021-05-24 PROCEDURE — 1123F ACP DISCUSS/DSCN MKR DOCD: CPT | Performed by: SURGERY

## 2021-05-24 PROCEDURE — G8427 DOCREV CUR MEDS BY ELIG CLIN: HCPCS | Performed by: SURGERY

## 2021-05-24 PROCEDURE — G8417 CALC BMI ABV UP PARAM F/U: HCPCS | Performed by: SURGERY

## 2021-05-24 PROCEDURE — 3017F COLORECTAL CA SCREEN DOC REV: CPT | Performed by: SURGERY

## 2021-05-24 NOTE — PROGRESS NOTES
level: Not on file   Occupational History    Not on file   Tobacco Use    Smoking status: Never Smoker    Smokeless tobacco: Never Used   Vaping Use    Vaping Use: Never used   Substance and Sexual Activity    Alcohol use: Yes     Comment: occ    Drug use: Never    Sexual activity: Yes   Other Topics Concern    Not on file   Social History Narrative    Not on file     Social Determinants of Health     Financial Resource Strain:     Difficulty of Paying Living Expenses:    Food Insecurity:     Worried About Running Out of Food in the Last Year:     [address] in the Last Year:    Transportation Needs:     Lack of Transportation (Medical):  Lack of Transportation (Non-Medical):    Physical Activity:     Days of Exercise per Week:     Minutes of Exercise per Session:    Stress:     Feeling of Stress :    Social Connections:     Frequency of Communication with Friends and Family:     Frequency of Social Gatherings with Friends and Family:     Attends Mu-ism Services:     Active Member of Clubs or Organizations:     Attends Club or Organization Meetings:     Marital Status:    Intimate Partner Violence:     Fear of Current or Ex-Partner:     Emotionally Abused:     Physically Abused:     Sexually Abused:           Vitals:    05/24/21 0932   BP: 130/84   Site: Left Upper Arm   Position: Sitting   Cuff Size: Large Adult   Weight: 236 lb (107 kg)   Height: 6' 2\" (1.88 m)     Body mass index is 30.3 kg/m².      Wt Readings from Last 3 Encounters:   05/24/21 236 lb (107 kg)   05/19/21 236 lb (107 kg)   02/05/21 225 lb (102.1 kg)     BP Readings from Last 3 Encounters:   05/24/21 130/84   05/19/21 114/72   02/05/21 (!) 140/90        REVIEW OF SYSTEMS:  CONSTITUTIONAL:  negative  HEENT:  Negative  RESPIRATORY:  negative  CARDIOVASCULAR:  negative  GASTROINTESTINAL:  negative  GENITOURINARY:  negative  HEMATOLOGIC/LYMPHATIC:  negative  ENDOCRINE:  Negative  NEUROLOGICAL:  Negative  * All other ROS reviewed and negative. PE:  Constitutional:  Well developed, well nourished, no acute distress, non-toxic appearance   Eyes:  PERRL, conjunctiva normal   HENT:  Atraumatic, external ears normal, nose normal. Neck- normal range of motion, no tenderness, supple   Respiratory:  No respiratory distress, normal breath sounds, no rales, no wheezing   Cardiovascular:  Normal rate, normal rhythm  Integument: Left neck on the posterior aspect demonstrates about a 2 cm area of erythema and induration with a prominent central pore consistent with an infected sebaceous cyst.  There is no fluctuance indicating need for drainage at this time  Lymphatic:  No lymphadenopathy noted   Neurologic:  Alert & oriented x 3, no focal deficits noted   Psychiatric:  Speech and behavior appropriate       DATA:  N/A      Assessment:  1. Infected sebaceous cyst        Plan: He is on antibiotics and improving. We will give a few weeks to allow for the infection to resolve and get him scheduled for excision of the sebaceous cyst under local anesthetic. I explained the procedure including risks, benefits, and alternatives. Questions were answered and the patient agrees to proceed.

## 2021-06-02 ENCOUNTER — CLINICAL DOCUMENTATION (OUTPATIENT)
Dept: OTHER | Age: 71
End: 2021-06-02

## 2021-06-08 NOTE — PROGRESS NOTES
Michelle Meza    Age 69 y.o.    male    1950    MRN [id number]    6/14/2021  Arrival Time_____________  OR Time____________30 Min     Procedure(s):  EXCISION OF NECK CYST                      Local    Surgeon(s):  Mariya Nielson, MD       Phone [phone] ([place])     [address]  Cell         Work  _____________________________________________________________________  _____________________________________________________________________  _____________________________________________________________________  _____________________________________________________________________  _____________________________________________________________________    PCP _____________________________ Phone_________________     H&P__________________Bringing      Chart            Epic   DOS      Called________  EKG__________________Bringing      Chart            Epic   DOS      Called________  LAB__________________ Bringing      Chart            Epic   DOS      Called________  Cardiac Clearance_______Bringing      Chart            Epic      DOS      Called________    Cardiologist________________________ Phone___________________________    ? Zoroastrian concerns / Waiver on Chart            PAT Communications________________  ? Pre-op Instructions Given [place]          _________________________________  ? Directions to Surgery Center                          _________________________________  ? Transportation Home_______________      __________________________________  ?  Crutches/Walker__________________        __________________________________    ________Pre-op Orders   _______Transcribed    _______Wt.  ________Pharmacy          _______SCD  ______VTE     ______[facility]  ________Consent             ________TED HOSE    COVID DATE______________LOCATION________________ RESULT__________     2ND VACCINE DATE_____________

## 2021-06-09 ENCOUNTER — PATIENT MESSAGE (OUTPATIENT)
Dept: CARDIOLOGY CLINIC | Age: 71
End: 2021-06-09

## 2021-06-09 NOTE — TELEPHONE ENCOUNTER
From: Peña Dean  To: Citlaly Vazquez MD  Sent: 6/9/2021 7:12 AM EDT  Subject: Prescription Question    I am having a cyst taken off on 6-14 and need to know how to modify my pills for the event.

## 2021-06-09 NOTE — LETTER
[address] Cardiology - [address] [facility] [address] [facility]  Phone: [phone]  Fax: Minh Bhagat MD      Cardiac Clearance Letter  Pauline 10, 2021    [address]   Date of Birth 1950        Jose Fong is cleared from a cardiac standpoint for upcoming cyst excision. This patient with uncomplicated atrial fibrillation. He should be okay to hold Coumadin for about 3 days prior to procedure okay to proceed with local anesthesia and procedure. If you have any questions or concerns, please don't hesitate to call.     Sincerely,        Krissy Tatum MD

## 2021-06-10 NOTE — TELEPHONE ENCOUNTER
Spoke with Pt he says Dr. Severa Garre is doing the removal of cyst on his neck. The surgery is Monday 6/14/21. Local anesthesia will be used. Pt would like to know what blood thinners he needs to hold and how long prior to the procedure. He is on Coumadin. He takes 5 mg once daily. He would like a letter put in epic for the surgeon, and send him a [employer] message with instructions.

## 2021-06-10 NOTE — TELEPHONE ENCOUNTER
This is a Dr. Som Brothers patient with uncomplicated atrial fibrillation.   He should be okay to hold Coumadin for about 3 days prior to procedure okay to proceed with local anesthesia and procedure

## 2021-06-14 ENCOUNTER — HOSPITAL ENCOUNTER (OUTPATIENT)
Age: 71
Setting detail: OUTPATIENT SURGERY
Discharge: HOME | End: 2021-06-14
Attending: SURGERY | Admitting: SURGERY
Payer: MEDICARE

## 2021-06-14 VITALS
WEIGHT: 232 LBS | DIASTOLIC BLOOD PRESSURE: 77 MMHG | OXYGEN SATURATION: 98 % | RESPIRATION RATE: 16 BRPM | HEART RATE: 65 BPM | BODY MASS INDEX: 29.77 KG/M2 | SYSTOLIC BLOOD PRESSURE: 130 MMHG | HEIGHT: 74 IN | TEMPERATURE: 97.8 F

## 2021-06-14 DIAGNOSIS — L72.0 CYST OF SKIN AND SUBCUTANEOUS TISSUE: ICD-10-CM

## 2021-06-14 PROCEDURE — 7100000011 HC PHASE II RECOVERY - ADDTL 15 MIN: Performed by: SURGERY

## 2021-06-14 PROCEDURE — 7100000010 HC PHASE II RECOVERY - FIRST 15 MIN: Performed by: SURGERY

## 2021-06-14 PROCEDURE — 2709999900 HC NON-CHARGEABLE SUPPLY: Performed by: SURGERY

## 2021-06-14 PROCEDURE — 88304 TISSUE EXAM BY PATHOLOGIST: CPT

## 2021-06-14 PROCEDURE — 11422 EXC H-F-NK-SP B9+MARG 1.1-2: CPT | Performed by: SURGERY

## 2021-06-14 PROCEDURE — 3600000002 HC SURGERY LEVEL 2 BASE: Performed by: SURGERY

## 2021-06-14 PROCEDURE — 2500000003 HC RX 250 WO HCPCS: Performed by: SURGERY

## 2021-06-14 PROCEDURE — 2580000003 HC RX 258: Performed by: SURGERY

## 2021-06-14 PROCEDURE — 3600000012 HC SURGERY LEVEL 2 ADDTL 15MIN: Performed by: SURGERY

## 2021-06-14 RX ORDER — LIDOCAINE HYDROCHLORIDE 10 MG/ML
INJECTION, SOLUTION INFILTRATION; PERINEURAL PRN
Status: DISCONTINUED | OUTPATIENT
Start: 2021-06-14 | End: 2021-06-14 | Stop reason: ALTCHOICE

## 2021-06-14 RX ORDER — MAGNESIUM HYDROXIDE 1200 MG/15ML
LIQUID ORAL CONTINUOUS PRN
Status: COMPLETED | OUTPATIENT
Start: 2021-06-14 | End: 2021-06-14

## 2021-06-14 ASSESSMENT — PAIN - FUNCTIONAL ASSESSMENT: PAIN_FUNCTIONAL_ASSESSMENT: 0-10

## 2021-06-14 NOTE — H&P
I have reviewed the progress note serving as history and physical dated May/24/ 2021 and examined the patient and find no relevant changes. I have reviewed with the patient and/or family the risks, benefits, and alternatives to the procedure.

## 2021-06-14 NOTE — OP NOTE
[address] [address]                                OPERATIVE REPORT    PATIENT NAME: JOLENE Alanis                   :        1950  MED REC NO:   [id number]                          ROOM:  ACCOUNT NO:   [de-identified]                           ADMIT DATE: 2021  PROVIDER:     Deneen Ashraf MD    DATE OF PROCEDURE:  2021    PREOPERATIVE DIAGNOSIS:  Sebaceous cyst of the neck. POSTOPERATIVE DIAGNOSIS:  Sebaceous cyst of the neck. PROCEDURE:  Excision of sebaceous cyst of the neck. ANESTHESIA:  Local.    SURGEON:  Deneen Ashraf MD    ESTIMATED BLOOD LOSS:  Minimal.    INDICATIONS:  The patient is a 67-year-old gentleman who presented with  an infected sebaceous cyst of the neck. He was treated with antibiotics  and this improved. He now presents for definitive excision    OPERATIVE SUMMARY:  After preoperative evaluation, the patient was  brought into the operating suite and placed in a comfortable supine  position on the operating room table. Monitoring equipment was attached  and he was placed in a right lateral decubitus position. His neck was  sterilely prepped and draped and anesthetized with local anesthetic. The 1.4 cm cyst was excised via a 1 x 3 cm ellipse of skin. Bleeding  was controlled with electrocautery. The incision was closed with  interrupted subcutaneous sutures of 3-0 Vicryl and running subcuticular  suture of 4-0 Vicryl. Benzoin, Steri-Strips, and dry sterile dressings  were applied. All sponge, needle, instrument counts were correct at the  end of case. The patient tolerated the procedure well. He was taken to  recovery area in stable condition. Jose C Hill MD    D: 2021 12:13:26       T: 2021 12:18:53     [email or web address]  Job#: [id number]     Doc#: [id number]    CC:   Noel Marte MD

## 2021-06-15 ENCOUNTER — TELEPHONE (OUTPATIENT)
Dept: SURGERY | Age: 71
End: 2021-06-15

## 2021-06-15 NOTE — TELEPHONE ENCOUNTER
----- Message from Garland Nissen, MD sent at 6/15/2021  1:44 PM EDT -----  Please call with path - benign skin cyst

## 2021-06-16 ENCOUNTER — TELEPHONE (OUTPATIENT)
Dept: INTERNAL MEDICINE CLINIC | Age: 71
End: 2021-06-16

## 2021-06-16 NOTE — TELEPHONE ENCOUNTER
[facility] Trans[facility] Initial Follow Up Call    Outreach made within 2 business days of discharge: Yes    Patient: eNva Wood Patient : 1950   MRN: <[id number]>  Reason for Admission: There are no discharge diagnoses documented for the most recent discharge.   Discharge Date: 21       Spoke with: Patient has been contacted from surgeons office for follow up and pathology results    Discharge department/facility: [facility]    Scheduled appointment with PCP within 7-14 days    Follow Up  Future Appointments   Date Time Provider Juaquin Finley   2021  9:50 AM Pattie Reyes MD [address], [place]

## 2021-06-29 ENCOUNTER — OFFICE VISIT (OUTPATIENT)
Dept: SURGERY | Age: 71
End: 2021-06-29

## 2021-06-29 VITALS
BODY MASS INDEX: 30.93 KG/M2 | WEIGHT: 241 LBS | DIASTOLIC BLOOD PRESSURE: 82 MMHG | SYSTOLIC BLOOD PRESSURE: 124 MMHG | HEIGHT: 74 IN

## 2021-06-29 DIAGNOSIS — Z09 POSTOP CHECK: Primary | ICD-10-CM

## 2021-06-29 PROCEDURE — 99024 POSTOP FOLLOW-UP VISIT: CPT | Performed by: SURGERY

## 2021-06-29 NOTE — PROGRESS NOTES
[facility] GENERAL SURGERY      S:   Patient presents s/p excision neck cyst.  He reports having some bloody drainage. O:   Comfortable         Incision site healing well. There is just one punctate opening in the middle that is draining some old blood. I expressed as much as I was able. The incision otherwise looks clean without sign of infection              A:   S/P excision of neck cyst    P:   I reassured him that this is not active bleeding but just some old blood draining out. Now that it is mostly evacuated, it should continue to heal and ultimately seal up in the next week or so. Follow up as needed.

## 2021-11-08 RX ORDER — WARFARIN SODIUM 5 MG/1
TABLET ORAL
Qty: 90 TABLET | Refills: 0 | Status: SHIPPED | OUTPATIENT
Start: 2021-11-08

## 2021-11-15 ASSESSMENT — LIFESTYLE VARIABLES
HOW OFTEN DO YOU HAVE SIX OR MORE DRINKS ON ONE OCCASION: NEVER
HOW OFTEN DURING THE LAST YEAR HAVE YOU BEEN UNABLE TO REMEMBER WHAT HAPPENED THE NIGHT BEFORE BECAUSE YOU HAD BEEN DRINKING: 0
HOW OFTEN DURING THE LAST YEAR HAVE YOU FAILED TO DO WHAT WAS NORMALLY EXPECTED FROM YOU BECAUSE OF DRINKING: NEVER
AUDIT-C TOTAL SCORE: 0
HOW OFTEN DURING THE LAST YEAR HAVE YOU BEEN UNABLE TO REMEMBER WHAT HAPPENED THE NIGHT BEFORE BECAUSE YOU HAD BEEN DRINKING: NEVER
HOW MANY STANDARD DRINKS CONTAINING ALCOHOL DO YOU HAVE ON A TYPICAL DAY: 0
HOW OFTEN DURING THE LAST YEAR HAVE YOU HAD A FEELING OF GUILT OR REMORSE AFTER DRINKING: 0
HOW OFTEN DURING THE LAST YEAR HAVE YOU NEEDED AN ALCOHOLIC DRINK FIRST THING IN THE MORNING TO GET YOURSELF GOING AFTER A NIGHT OF HEAVY DRINKING: 0
HOW OFTEN DURING THE LAST YEAR HAVE YOU FOUND THAT YOU WERE NOT ABLE TO STOP DRINKING ONCE YOU HAD STARTED: NEVER
AUDIT TOTAL SCORE: 0
HOW OFTEN DO YOU HAVE A DRINK CONTAINING ALCOHOL: 2
HAVE YOU OR SOMEONE ELSE BEEN INJURED AS A RESULT OF YOUR DRINKING: 0
HOW OFTEN DURING THE LAST YEAR HAVE YOU NEEDED AN ALCOHOLIC DRINK FIRST THING IN THE MORNING TO GET YOURSELF GOING AFTER A NIGHT OF HEAVY DRINKING: NEVER
HOW OFTEN DURING THE LAST YEAR HAVE YOU FOUND THAT YOU WERE NOT ABLE TO STOP DRINKING ONCE YOU HAD STARTED: 0
AUDIT TOTAL SCORE: 2
HAS A RELATIVE, FRIEND, DOCTOR, OR ANOTHER HEALTH PROFESSIONAL EXPRESSED CONCERN ABOUT YOUR DRINKING OR SUGGESTED YOU CUT DOWN: NO
HOW OFTEN DO YOU HAVE SIX OR MORE DRINKS ON ONE OCCASION: 0
HOW OFTEN DO YOU HAVE A DRINK CONTAINING ALCOHOL: TWO TO FOUR TIMES A MONTH
AUDIT-C TOTAL SCORE: 2
HOW OFTEN DURING THE LAST YEAR HAVE YOU FAILED TO DO WHAT WAS NORMALLY EXPECTED FROM YOU BECAUSE OF DRINKING: 0
HOW MANY STANDARD DRINKS CONTAINING ALCOHOL DO YOU HAVE ON A TYPICAL DAY: ONE OR TWO
HOW OFTEN DURING THE LAST YEAR HAVE YOU HAD A FEELING OF GUILT OR REMORSE AFTER DRINKING: NEVER
HAVE YOU OR SOMEONE ELSE BEEN INJURED AS A RESULT OF YOUR DRINKING: NO
HAS A RELATIVE, FRIEND, DOCTOR, OR ANOTHER HEALTH PROFESSIONAL EXPRESSED CONCERN ABOUT YOUR DRINKING OR SUGGESTED YOU CUT DOWN: 0

## 2021-11-15 ASSESSMENT — PATIENT HEALTH QUESTIONNAIRE - PHQ9
1. LITTLE INTEREST OR PLEASURE IN DOING THINGS: 0
SUM OF ALL RESPONSES TO PHQ9 QUESTIONS 1 & 2: 0
SUM OF ALL RESPONSES TO PHQ QUESTIONS 1-9: 0
SUM OF ALL RESPONSES TO PHQ QUESTIONS 1-9: 0
2. FEELING DOWN, DEPRESSED OR HOPELESS: 0
SUM OF ALL RESPONSES TO PHQ QUESTIONS 1-9: 0

## 2021-11-18 ENCOUNTER — OFFICE VISIT (OUTPATIENT)
Dept: INTERNAL MEDICINE CLINIC | Age: 71
End: 2021-11-18
Payer: MEDICARE

## 2021-11-18 ENCOUNTER — TELEPHONE (OUTPATIENT)
Dept: INTERNAL MEDICINE CLINIC | Age: 71
End: 2021-11-18

## 2021-11-18 ENCOUNTER — ANTI-COAG VISIT (OUTPATIENT)
Dept: PHARMACY | Age: 71
End: 2021-11-18
Payer: MEDICARE

## 2021-11-18 VITALS
OXYGEN SATURATION: 98 % | DIASTOLIC BLOOD PRESSURE: 72 MMHG | HEART RATE: 85 BPM | WEIGHT: 241 LBS | HEIGHT: 74 IN | SYSTOLIC BLOOD PRESSURE: 126 MMHG | BODY MASS INDEX: 30.93 KG/M2

## 2021-11-18 DIAGNOSIS — I10 ESSENTIAL HYPERTENSION: ICD-10-CM

## 2021-11-18 DIAGNOSIS — M54.6 ACUTE RIGHT-SIDED THORACIC BACK PAIN: ICD-10-CM

## 2021-11-18 DIAGNOSIS — I48.20 CHRONIC ATRIAL FIBRILLATION (HCC): ICD-10-CM

## 2021-11-18 DIAGNOSIS — Z79.01 ANTICOAGULATED ON COUMADIN: ICD-10-CM

## 2021-11-18 DIAGNOSIS — R31.0 GROSS HEMATURIA: Primary | ICD-10-CM

## 2021-11-18 LAB
BILIRUBIN, POC: NEGATIVE
BLOOD URINE, POC: ABNORMAL
CLARITY, POC: ABNORMAL
COLOR, POC: ABNORMAL
GLUCOSE URINE, POC: NEGATIVE
INR BLD: 2.9
KETONES, POC: NEGATIVE
LEUKOCYTE EST, POC: NEGATIVE
NITRITE, POC: NEGATIVE
PH, POC: 6
PROTEIN, POC: NEGATIVE
SPECIFIC GRAVITY, POC: >=1.03
UROBILINOGEN, POC: 0.2

## 2021-11-18 PROCEDURE — 4040F PNEUMOC VAC/ADMIN/RCVD: CPT | Performed by: INTERNAL MEDICINE

## 2021-11-18 PROCEDURE — 99211 OFF/OP EST MAY X REQ PHY/QHP: CPT | Performed by: FAMILY MEDICINE

## 2021-11-18 PROCEDURE — G8417 CALC BMI ABV UP PARAM F/U: HCPCS | Performed by: INTERNAL MEDICINE

## 2021-11-18 PROCEDURE — G8427 DOCREV CUR MEDS BY ELIG CLIN: HCPCS | Performed by: INTERNAL MEDICINE

## 2021-11-18 PROCEDURE — 81002 URINALYSIS NONAUTO W/O SCOPE: CPT | Performed by: INTERNAL MEDICINE

## 2021-11-18 PROCEDURE — 99214 OFFICE O/P EST MOD 30 MIN: CPT | Performed by: INTERNAL MEDICINE

## 2021-11-18 PROCEDURE — 85610 PROTHROMBIN TIME: CPT | Performed by: FAMILY MEDICINE

## 2021-11-18 PROCEDURE — 1036F TOBACCO NON-USER: CPT | Performed by: INTERNAL MEDICINE

## 2021-11-18 PROCEDURE — G8484 FLU IMMUNIZE NO ADMIN: HCPCS | Performed by: INTERNAL MEDICINE

## 2021-11-18 PROCEDURE — 1123F ACP DISCUSS/DSCN MKR DOCD: CPT | Performed by: INTERNAL MEDICINE

## 2021-11-18 PROCEDURE — 3017F COLORECTAL CA SCREEN DOC REV: CPT | Performed by: INTERNAL MEDICINE

## 2021-11-18 RX ORDER — FINASTERIDE 5 MG/1
5 TABLET, FILM COATED ORAL DAILY
Qty: 90 TABLET | Refills: 3 | Status: SHIPPED | OUTPATIENT
Start: 2021-11-18

## 2021-11-18 RX ORDER — CARVEDILOL 25 MG/1
25 TABLET ORAL 2 TIMES DAILY
Qty: 180 TABLET | Refills: 3 | Status: SHIPPED | OUTPATIENT
Start: 2021-11-18

## 2021-11-18 RX ORDER — LOSARTAN POTASSIUM AND HYDROCHLOROTHIAZIDE 25; 100 MG/1; MG/1
1 TABLET ORAL DAILY
Qty: 90 TABLET | Refills: 1 | Status: SHIPPED | OUTPATIENT
Start: 2021-11-18

## 2021-11-18 SDOH — ECONOMIC STABILITY: FOOD INSECURITY: WITHIN THE PAST 12 MONTHS, THE FOOD YOU BOUGHT JUST DIDN'T LAST AND YOU DIDN'T HAVE MONEY TO GET MORE.: NEVER TRUE

## 2021-11-18 SDOH — ECONOMIC STABILITY: FOOD INSECURITY: WITHIN THE PAST 12 MONTHS, YOU WORRIED THAT YOUR FOOD WOULD RUN OUT BEFORE YOU GOT MONEY TO BUY MORE.: NEVER TRUE

## 2021-11-18 ASSESSMENT — SOCIAL DETERMINANTS OF HEALTH (SDOH): HOW HARD IS IT FOR YOU TO PAY FOR THE VERY BASICS LIKE FOOD, HOUSING, MEDICAL CARE, AND HEATING?: NOT HARD AT ALL

## 2021-11-18 NOTE — PROGRESS NOTES
Jesus Penny  YOB: 1950    Date of Service:  11/18/2021    Chief Complaint:      Chief Complaint   Patient presents with    Back Pain    Hematuria       Assessment/Plan:  Rangel Gonzalez was seen today for back pain and hematuria. Diagnoses and all orders for this visit:    Gross hematuria  -     POCT Urinalysis no Micro  Pt to go get INR now at the coumadin clinic. Hold coumadin for today. If persistent bleeding, pt to follow up with urologist.    Acute right-sided thoracic back pain  Tyl as needed    Essential hypertension  -     losartan-hydroCHLOROthiazide (HYZAAR) 100-25 MG per tablet; Take 1 tablet by mouth daily  -     carvedilol (COREG) 25 MG tablet; Take 1 tablet by mouth 2 times daily    Chronic atrial fibrillation (HCC)  Pt to go get INR now at the coumadin clinic    Anticoagulated on Coumadin  Pt to go get INR now at the coumadin clinic    Other orders  -     finasteride (PROSCAR) 5 MG tablet; Take 1 tablet by mouth daily      Return Medicare Wellness 11/22. HPI:  Jesus Penny is a 66 y.o. He complain of right flank pain that started 5 days ago and noticed blood in urine 2 days ago. He does have a history of bladder stones and had to have surgery to have it removed from urologist.  No injuries or heavy lifting. No dysuria or frequency. Chronic A. Fib:  Stable on Coumadin 5 mg qd with INR 2.5  Hypertension:  Home blood pressure monitoring: Yes - 130/70 on Losartan/hctz 100/25 mg qd and Coreg 25 mg bid.  He is adherent to a low sodium diet.  Patient denies chest pain, shortness of breath, headache, lightheadedness, blurred vision, peripheral edema, palpitations and fatigue.  Antihypertensive medication side effects: no medication side effects noted.  Use of agents associated with hypertension: none.    BPH:  Stable on Proscar 5 mg qd  Bilateral leg edema:  Stable on Lasix 40 mg prn once a week    No results found for: LABA1C, LABMICR  No results found for: NA, K, CL, CO2, BUN, CREATININE, GLUCOSE, CALCIUM  Lab Results   Component Value Date    CHOL 166 03/05/2021    TRIG 206 03/05/2021    HDL 30 03/05/2021    LDLCALC 95 03/05/2021     Lab Results   Component Value Date    ALT 19 03/05/2021    AST 20 03/05/2021     No results found for: TSH, T4FREE  No results found for: WBC, HGB, HCT, MCV, PLT  Lab Results   Component Value Date    INR 2.9 11/18/2021    INR 2.4 04/30/2021      No results found for: PSA   No results found for: [facility]     Patient Active Problem List   Diagnosis    Chronic atrial fibrillation ([address]    Essential hypertension    Cyst of skin and subcutaneous tissue       Allergies   Allergen Reactions    Amlodipine      Cough, swelling       Other      All mycin drugs        Outpatient Medications Marked as Taking for the 11/18/21 encounter (Office Visit) with Paulo Perdomo MD   Medication Sig Dispense Refill    warfarin (COUMADIN) 5 MG tablet TAKE 1 TABLET EVERY DAY 90 tablet 0    Omega-3 Fatty Acids (FISH OIL) 1000 MG CAPS Take 2 capsules by mouth daily 90 capsule 3    losartan-hydroCHLOROthiazide (HYZAAR) 100-25 MG per tablet Take 1 tablet by mouth daily 90 tablet 2    furosemide (LASIX) 40 MG tablet Take 1 tablet by mouth as needed (swelling) 90 tablet 0    finasteride (PROSCAR) 5 MG tablet Take 1 tablet by mouth daily 90 tablet 3    carvedilol (COREG) 25 MG tablet Take 1 tablet by mouth 2 times daily 180 tablet 3         Review of Systems: 14 systems were negative except of what was stated on HPI    Nursing note and vitals reviewed. Vitals:    11/18/21 1014   BP: 126/72   Pulse: 85   SpO2: 98%   Weight: 241 lb (109.3 kg)   Height: 6' 2\" (1.88 m)     Wt Readings from Last 3 Encounters:   11/18/21 241 lb (109.3 kg)   06/29/21 241 lb (109.3 kg)   06/14/21 232 lb (105.2 kg)     BP Readings from Last 3 Encounters:   11/18/21 126/72   06/29/21 124/82   06/14/21 130/77     Body mass index is 30.94 kg/m².   Constitutional: Patient appears well-developed and well-nourished. No distress. Head: Normocephalic and atraumatic. Neck: Normal range of motion. Neck supple. No thyroidmegaly. Cardiovascular: Normal rate, regular rhythm, normal heart sounds and intact distal pulses. Pulmonary/Chest: Effort normal and breath sounds normal. No stridor. No respiratory distress. No wheezes and no rales. Abdominal: Soft. Bowel sounds are normal. No distension and no mass. No tenderness. No rebound and no guarding. Musculoskeletal: No edema and no tenderness. Skin: No rash or erythema.

## 2021-11-18 NOTE — TELEPHONE ENCOUNTER
FYI:  Olman with [facility] at [facility]. INR for Moses Chandra was 2.9.

## 2021-11-18 NOTE — PROGRESS NOTES
Mr. Christos Ding is here for management of anticoagulation for Afib. PMH also significant for HTN, BPH. He presents today w/out complaint. Pt verifies dosing regimen as listed above. Pt denies s/sx bleeding/bruising/swelling/SOB. No missed doses  No changes in RX/OTCs/Herbal medications. No dietary concerns  Occasional ETOH use, and No tobacco use. Pt has been on warfarin for 4-5 years    Pt takes in the AM.   Patient here from his MD office, has been having blood in his urine. Came to get his INR checked. INR in range, would keep dose the same. Called patient's MD office, Dr. Shaun Virgen to update her on current INR     INR 2.9 is within therapeutic range of 2-3  Recommend to continue with 5 mg daily  Pt has 5mg tablets   Will continue to monitor and check INR in 4 weeks. Dosing reminder card given with phone number, appointment date and time.    Return to clinic: 12/16 @ 845 am  Referring Physician: Armen Bollard MD Lazarus Sayers, PharmD 11/18/2021 10:53 AM

## 2021-11-22 ENCOUNTER — OFFICE VISIT (OUTPATIENT)
Dept: INTERNAL MEDICINE CLINIC | Age: 71
End: 2021-11-22
Payer: MEDICARE

## 2021-11-22 VITALS
OXYGEN SATURATION: 98 % | WEIGHT: 242 LBS | SYSTOLIC BLOOD PRESSURE: 130 MMHG | HEIGHT: 74 IN | HEART RATE: 68 BPM | BODY MASS INDEX: 31.06 KG/M2 | DIASTOLIC BLOOD PRESSURE: 82 MMHG

## 2021-11-22 DIAGNOSIS — Z00.00 ROUTINE GENERAL MEDICAL EXAMINATION AT A HEALTH CARE FACILITY: Primary | ICD-10-CM

## 2021-11-22 DIAGNOSIS — Z11.59 ENCOUNTER FOR HEPATITIS C SCREENING TEST FOR LOW RISK PATIENT: ICD-10-CM

## 2021-11-22 DIAGNOSIS — I10 ESSENTIAL HYPERTENSION: ICD-10-CM

## 2021-11-22 DIAGNOSIS — N40.0 BENIGN PROSTATIC HYPERPLASIA WITHOUT LOWER URINARY TRACT SYMPTOMS: ICD-10-CM

## 2021-11-22 DIAGNOSIS — E78.1 HYPERTRIGLYCERIDEMIA: ICD-10-CM

## 2021-11-22 PROCEDURE — G0439 PPPS, SUBSEQ VISIT: HCPCS | Performed by: INTERNAL MEDICINE

## 2021-11-22 PROCEDURE — 3017F COLORECTAL CA SCREEN DOC REV: CPT | Performed by: INTERNAL MEDICINE

## 2021-11-22 PROCEDURE — 1123F ACP DISCUSS/DSCN MKR DOCD: CPT | Performed by: INTERNAL MEDICINE

## 2021-11-22 PROCEDURE — G8484 FLU IMMUNIZE NO ADMIN: HCPCS | Performed by: INTERNAL MEDICINE

## 2021-11-22 PROCEDURE — 4040F PNEUMOC VAC/ADMIN/RCVD: CPT | Performed by: INTERNAL MEDICINE

## 2021-11-22 RX ORDER — OMEGA-3-ACID ETHYL ESTERS 1 G/1
2 CAPSULE, LIQUID FILLED ORAL 2 TIMES DAILY
Qty: 360 CAPSULE | Refills: 3 | Status: SHIPPED | OUTPATIENT
Start: 2021-11-22

## 2021-11-22 NOTE — PROGRESS NOTES
Medicare Annual Wellness Visit  Name: Sylvia Setting Date: 2021   MRN: <[id number]> Sex: Male   Age: 66 y.o. Ethnicity: Non- / Non    : 1950 Race: White (non-)      Shaina Pelletier is here for Medicare AWV and Hypertension    Screenings for behavioral, psychosocial and functional/safety risks, and cognitive dysfunction are all negative except as indicated below. These results, as well as other patient data from the [address] form, are documented in Flowsheets linked to this Encounter. Allergies   Allergen Reactions    Amlodipine      Cough, swelling       Other      All mycin drugs          Prior to Visit Medications    Medication Sig Taking? Authorizing Provider   losartan-hydroCHLOROthiazide (HYZAAR) 100-25 MG per tablet Take 1 tablet by mouth daily Yes Pia Perdomo MD   finasteride (PROSCAR) 5 MG tablet Take 1 tablet by mouth daily Yes Pia Perdomo MD   carvedilol (COREG) 25 MG tablet Take 1 tablet by mouth 2 times daily Yes Pia Perdomo MD   warfarin (COUMADIN) 5 MG tablet TAKE 1 TABLET EVERY DAY Yes Ag Patel MD   Omega-3 Fatty Acids (FISH OIL) 1000 MG CAPS Take 2 capsules by mouth daily Yes Ag Patel MD   furosemide (LASIX) 40 MG tablet Take 1 tablet by mouth as needed (swelling) Yes Cherrie Perdomo MD       Past Medical History:   Diagnosis Date    Atrial fibrillation ([facility] [address]     Enlarged prostate     Hypertension        Past Surgical History:   Procedure Laterality Date    FACIAL SURGERY      KNEE CARTILAGE SURGERY Right     NECK SURGERY N/A 2021    EXCISION OF NECK CYST performed by Quan Salcido MD at [address] [place] [address]         Family History   Problem Relation Age of Onset    High Blood Pressure Mother     Other Father         Pulmonary Embolism     High Blood Pressure Father     High Blood Pressure Sister     High Blood Pressure Brother     High Blood Pressure Sister     Cancer Maternal Uncle         ?     Early Death [facility] [place] (Including outside providers/suppliers regularly involved in providing care):   Patient Care Team:  Ca Perdomo MD as PCP - General (Internal Medicine)  Ca Perdomo MD as PCP - [facility] Provider    Wt Readings from Last 3 Encounters:   11/22/21 242 lb (109.8 kg)   11/18/21 241 lb (109.3 kg)   06/29/21 241 lb (109.3 kg)     Vitals:    11/22/21 0929   BP: 130/82   Pulse: 68   SpO2: 98%   Weight: 242 lb (109.8 kg)   Height: 6' 2\" (1.88 m)     Body mass index is 31.07 kg/m². Based upon direct observation of the patient, evaluation of cognition reveals recent and remote memory intact. Patient's complete Health Risk Assessment and screening values have been reviewed and are found in Flowsheets. The following problems were reviewed today and where indicated follow up appointments were made and/or referrals ordered. Positive Risk Factor Screenings with Interventions:          General Health and ACP:  General  In general, how would you say your health is?: Good  In the past 7 days, have you experienced any of the following?  New or Increased Pain, New or Increased Fatigue, Loneliness, Social Isolation, Stress or Anger?: None of These  Do you get the social and emotional support that you need?: Yes  Do you have a Living Will?: (!) No (needs to make one)  Advance Directives     Power of [address] [place] [address] Will ACP-Advance Directive ACP-Power of     Not on File Not on File Not on File Not on File      General Health Risk Interventions:  · No Living Will: Full code    Health Habits/Nutrition:  Health Habits/Nutrition  Do you exercise for at least 20 minutes 2-3 times per week?: Yes  Have you lost any weight without trying in the past 3 months?: No  Do you eat only one meal per day?: No  Have you seen the dentist within the past year?: (!) No (needs to make an appointment)  Body mass index: (!) 31.07  Health Habits/Nutrition Interventions:  · Dental exam overdue:  patient encouraged to make appointment with his/her dentist    Hearing/Vision:  No exam data present  Hearing/Vision  Do you or your family notice any trouble with your hearing that hasn't been managed with hearing aids?: No  Do you have difficulty driving, watching TV, or doing any of your daily activities because of your eyesight?: No  Have you had an eye exam within the past year?: (!) No (needs to make an appointment)  Hearing/Vision Interventions:  · Vision concerns:  patient encouraged to make appointment with his/her eye specialist      Personalized Preventive Plan   Current Health Maintenance Status  Immunization History   Administered Date(s) Administered    COVID-19, Moderna, Booster, PF, 50mcg/0.25ml 11/12/2021    COVID-19, Tan Ser, Primary or Immunocompromised, PF, 100mcg/0.5mL 02/06/2021, 03/06/2021    Influenza, High Dose (Fluzone 72 yrs and older) 09/15/2020        Health Maintenance   Topic Date Due    Potassium monitoring  Never done    Creatinine monitoring  Never done    Hepatitis C screen  Never done    DTaP/Tdap/Td vaccine (1 - Tdap) Never done    Colon cancer screen colonoscopy  Never done    Shingles Vaccine (1 of 2) Never done    Pneumococcal 65+ years Vaccine (1 of 1 - PPSV23) Never done   [employer] Visit (AWV)  11/20/2021    Lipid screen  03/05/2026    Flu vaccine  Completed    COVID-19 Vaccine  Completed    Hepatitis A vaccine  Aged Out    Hepatitis B vaccine  Aged Out    Hib vaccine  Aged Out    Meningococcal (ACWY) vaccine  Aged Out     Recommendations for Familytic Due: see orders and patient instructions/AVS.  . Recommended screening schedule for the next 5-10 years is provided to the patient in written form: see Patient Instructions/AVS.    Marilee Cavazos was seen today for medicare awv and hypertension.     Diagnoses and all orders for this visit:    Routine general medical examination at a health care facility  -     Full code    Encounter for hepatitis C screening

## 2021-11-22 NOTE — PATIENT INSTRUCTIONS
Personalized Preventive Plan for No Delgado - 11/22/2021  Medicare offers a range of preventive health benefits. Some of the tests and screenings are paid in full while other may be subject to a deductible, co-insurance, and/or copay. Some of these benefits include a comprehensive review of your medical history including lifestyle, illnesses that may run in your family, and various assessments and screenings as appropriate. After reviewing your medical record and screening and assessments performed today your provider may have ordered immunizations, labs, imaging, and/or referrals for you. A list of these orders (if applicable) as well as your Preventive Care list are included within your After Visit Summary for your review. Other Preventive Recommendations:    · A preventive eye exam performed by an eye specialist is recommended every 1-2 years to screen for glaucoma; cataracts, macular degeneration, and other eye disorders. · A preventive dental visit is recommended every 6 months. · Try to get at least 150 minutes of exercise per week or 10,000 steps per day on a pedometer . · Order or download the FREE \"Exercise & Physical Activity: Your Everyday Guide\" from [employer]. Call 9-767.393.5860 or search [employer] online. · You need 6524-1348 mg of calcium and 6071-8246 IU of vitamin D per day. It is possible to meet your calcium requirement with diet alone, but a vitamin D supplement is usually necessary to meet this goal.  · When exposed to the sun, use a sunscreen that protects against both UVA and UVB radiation with an SPF of 30 or greater. Reapply every 2 to 3 hours or after sweating, drying off with a towel, or swimming. · Always wear a seat belt when traveling in a car. Always wear a helmet when riding a bicycle or motorcycle.

## 2021-11-23 ENCOUNTER — HOSPITAL ENCOUNTER (OUTPATIENT)
Age: 71
Discharge: HOME | End: 2021-11-23
Payer: MEDICARE

## 2021-11-23 DIAGNOSIS — I10 ESSENTIAL HYPERTENSION: ICD-10-CM

## 2021-11-23 DIAGNOSIS — Z11.59 ENCOUNTER FOR HEPATITIS C SCREENING TEST FOR LOW RISK PATIENT: ICD-10-CM

## 2021-11-23 LAB
A/G RATIO: 1.2 (ref 1.1–2.2)
ALBUMIN SERPL-MCNC: 4.2 G/DL (ref 3.4–5)
ALP BLD-CCNC: 63 U/L (ref 40–129)
ALT SERPL-CCNC: 20 U/L (ref 10–40)
ANION GAP SERPL CALCULATED.3IONS-SCNC: 11 MMOL/L (ref 3–16)
AST SERPL-CCNC: 19 U/L (ref 15–37)
BASOPHILS ABSOLUTE: 0.1 K/UL (ref 0–0.2)
BASOPHILS RELATIVE PERCENT: 0.9 %
BILIRUB SERPL-MCNC: 1.5 MG/DL (ref 0–1)
BUN BLDV-MCNC: 17 MG/DL (ref 7–20)
CALCIUM SERPL-MCNC: 9.7 MG/DL (ref 8.3–10.6)
CHLORIDE BLD-SCNC: 103 MMOL/L (ref 99–110)
CHOLESTEROL, TOTAL: 182 MG/DL (ref 0–199)
CO2: 28 MMOL/L (ref 21–32)
CREAT SERPL-MCNC: 1.2 MG/DL (ref 0.8–1.3)
EOSINOPHILS ABSOLUTE: 0.1 K/UL (ref 0–0.6)
EOSINOPHILS RELATIVE PERCENT: 1.9 %
GFR AFRICAN AMERICAN: >60
GFR NON-AFRICAN AMERICAN: 59
GLUCOSE BLD-MCNC: 91 MG/DL (ref 70–99)
HCT VFR BLD CALC: 48.2 % (ref 40.5–52.5)
HDLC SERPL-MCNC: 29 MG/DL (ref 40–60)
HEMOGLOBIN: 16.1 G/DL (ref 13.5–17.5)
HEPATITIS C ANTIBODY INTERPRETATION: NORMAL
LDL CHOLESTEROL CALCULATED: ABNORMAL MG/DL
LDL CHOLESTEROL DIRECT: 83 MG/DL
LYMPHOCYTES ABSOLUTE: 1.8 K/UL (ref 1–5.1)
LYMPHOCYTES RELATIVE PERCENT: 30.3 %
MCH RBC QN AUTO: 32.4 PG (ref 26–34)
MCHC RBC AUTO-ENTMCNC: 33.5 G/DL (ref 31–36)
MCV RBC AUTO: 96.8 FL (ref 80–100)
MONOCYTES ABSOLUTE: 0.5 K/UL (ref 0–1.3)
MONOCYTES RELATIVE PERCENT: 9 %
NEUTROPHILS ABSOLUTE: 3.5 K/UL (ref 1.7–7.7)
NEUTROPHILS RELATIVE PERCENT: 57.9 %
PDW BLD-RTO: 13.2 % (ref 12.4–15.4)
PLATELET # BLD: 165 K/UL (ref 135–450)
PMV BLD AUTO: 9.8 FL (ref 5–10.5)
POTASSIUM SERPL-SCNC: 4.3 MMOL/L (ref 3.5–5.1)
RBC # BLD: 4.98 M/UL (ref 4.2–5.9)
SODIUM BLD-SCNC: 142 MMOL/L (ref 136–145)
TOTAL PROTEIN: 7.7 G/DL (ref 6.4–8.2)
TRIGL SERPL-MCNC: 387 MG/DL (ref 0–150)
VLDLC SERPL CALC-MCNC: ABNORMAL MG/DL
WBC # BLD: 6 K/UL (ref 4–11)

## 2021-11-23 PROCEDURE — 80061 LIPID PANEL: CPT

## 2021-11-23 PROCEDURE — 86803 HEPATITIS C AB TEST: CPT

## 2021-11-23 PROCEDURE — 36415 COLL VENOUS BLD VENIPUNCTURE: CPT

## 2021-11-23 PROCEDURE — 85025 COMPLETE CBC W/AUTO DIFF WBC: CPT

## 2021-11-23 PROCEDURE — 80053 COMPREHEN METABOLIC PANEL: CPT

## 2021-12-16 ENCOUNTER — ANTI-COAG VISIT (OUTPATIENT)
Dept: PHARMACY | Age: 71
End: 2021-12-16
Payer: MEDICARE

## 2021-12-16 LAB — INR BLD: 4.2

## 2021-12-16 PROCEDURE — 99211 OFF/OP EST MAY X REQ PHY/QHP: CPT | Performed by: FAMILY MEDICINE

## 2021-12-16 PROCEDURE — 85610 PROTHROMBIN TIME: CPT | Performed by: FAMILY MEDICINE

## 2021-12-16 NOTE — PROGRESS NOTES
MrZoraida Beach is here for management of anticoagulation for Afib. PMH also significant for HTN, BPH. He presents today w/out complaint. Pt verifies dosing regimen as listed above. Pt denies s/sx bleeding/bruising/swelling/SOB. No missed doses  No changes in RX/OTCs/Herbal medications. No dietary concerns  Occasional ETOH use, and No tobacco use. Pt has been on warfarin for 4-5 years  Pt takes in the AM.     **Pt will be moving back to [facility] and he will no longer be seen at this clinic effective Jan 1, 2022**    Pt states his BP was too low, so doctor recommended stopping losartan/HCTZ until BP normalizes and to take only 1/2 tablet. Otherwise, pt denies missed doses or any other changes in diet. Will monitor INR in 1 week. INR 4.2 is above therapeutic range of 2-3  Recommend to hold 2 doses, then continue with 5 mg daily  Pt has 5mg tablets   Will continue to monitor and check INR in 1 week  Dosing reminder card given with phone number, appointment date and time.    Return to clinic: 12/23 @ 8:45 am  Referring Physician: Andra Saez  PharmD Candidate 2022    I have seen the patient and I agree with the assessment and plan created by the PharmD Candidate  Chadwick Tamayo PharmD 12/16/2021 8:56 AM

## 2021-12-23 ENCOUNTER — ANTI-COAG VISIT (OUTPATIENT)
Dept: PHARMACY | Age: 71
End: 2021-12-23
Payer: MEDICARE

## 2021-12-23 LAB — INR BLD: 1.8

## 2021-12-23 PROCEDURE — 85610 PROTHROMBIN TIME: CPT

## 2021-12-23 PROCEDURE — 99211 OFF/OP EST MAY X REQ PHY/QHP: CPT

## 2021-12-23 NOTE — PROGRESS NOTES
MrZoraida Robles is here for management of anticoagulation for Afib. PMH also significant for HTN, BPH. He presents today w/out complaint. Pt verifies dosing regimen as listed above. Pt denies s/sx bleeding/bruising/swelling/SOB. No missed doses  No changes in RX/OTCs/Herbal medications. No dietary concerns  Occasional ETOH use, and No tobacco use. Pt has been on warfarin for 4-5 years  Pt takes in the AM.     **Pt will be moving back to [facility] and he will no longer be seen at this clinic effective Jan 1, 2022**      INR 1.8 is slightly below therapeutic range of 2-3. This is after holding for elevated INR d/t pt not eating any of his normal greens. Recommend to resume  5 mg daily since pt has resumed normal greens in his diet. Pt has 5mg tablets   Pt already has an appt for f/u in [place].    Referring Physician: Belkis Yip MD

## 2022-01-18 ENCOUNTER — TELEPHONE (OUTPATIENT)
Dept: INTERNAL MEDICINE CLINIC | Age: 72
End: 2022-01-18

## 2024-08-27 ENCOUNTER — ANTI-COAG VISIT (OUTPATIENT)
Dept: PHARMACY | Age: 74
End: 2024-08-27

## 2024-09-22 ENCOUNTER — APPOINTMENT (OUTPATIENT)
Dept: GENERAL RADIOLOGY | Age: 74
End: 2024-09-22
Payer: COMMERCIAL

## 2024-09-22 ENCOUNTER — APPOINTMENT (OUTPATIENT)
Dept: ULTRASOUND IMAGING | Age: 74
End: 2024-09-22
Payer: COMMERCIAL

## 2024-09-22 ENCOUNTER — HOSPITAL ENCOUNTER (EMERGENCY)
Age: 74
Discharge: HOME | End: 2024-09-22
Attending: STUDENT IN AN ORGANIZED HEALTH CARE EDUCATION/TRAINING PROGRAM
Payer: COMMERCIAL

## 2024-09-22 ENCOUNTER — APPOINTMENT (OUTPATIENT)
Dept: CT IMAGING | Age: 74
End: 2024-09-22
Payer: COMMERCIAL

## 2024-09-22 VITALS
RESPIRATION RATE: 15 BRPM | BODY MASS INDEX: 28.27 KG/M2 | DIASTOLIC BLOOD PRESSURE: 86 MMHG | WEIGHT: 220.3 LBS | HEIGHT: 74 IN | HEART RATE: 60 BPM | TEMPERATURE: 97.8 F | SYSTOLIC BLOOD PRESSURE: 138 MMHG | OXYGEN SATURATION: 95 %

## 2024-09-22 DIAGNOSIS — R31.9 HEMATURIA, UNSPECIFIED TYPE: Primary | ICD-10-CM

## 2024-09-22 DIAGNOSIS — R79.1 SUPRATHERAPEUTIC INR: ICD-10-CM

## 2024-09-22 DIAGNOSIS — N50.819 PAIN IN TESTICLE, UNSPECIFIED LATERALITY: ICD-10-CM

## 2024-09-22 DIAGNOSIS — R10.2 SUPRAPUBIC PAIN, ACUTE: ICD-10-CM

## 2024-09-22 DIAGNOSIS — M25.561 ACUTE PAIN OF RIGHT KNEE: ICD-10-CM

## 2024-09-22 DIAGNOSIS — N43.3 HYDROCELE IN ADULT: ICD-10-CM

## 2024-09-22 DIAGNOSIS — N50.3 EPIDIDYMAL CYST: ICD-10-CM

## 2024-09-22 LAB
ALBUMIN SERPL-MCNC: 4.3 G/DL (ref 3.4–5)
ALBUMIN/GLOB SERPL: 1.5 {RATIO} (ref 1.1–2.2)
ALP SERPL-CCNC: 88 U/L (ref 40–129)
ALT SERPL-CCNC: 11 U/L (ref 10–40)
ANION GAP SERPL CALCULATED.3IONS-SCNC: 7 MMOL/L (ref 3–16)
APTT BLD: 91.1 SEC (ref 22.1–36.4)
AST SERPL-CCNC: 15 U/L (ref 15–37)
BASOPHILS # BLD: 0.1 K/UL (ref 0–0.2)
BASOPHILS NFR BLD: 1 %
BILIRUB SERPL-MCNC: 1.4 MG/DL (ref 0–1)
BILIRUB UR QL STRIP.AUTO: NEGATIVE
BUN SERPL-MCNC: 13 MG/DL (ref 7–20)
CALCIUM SERPL-MCNC: 9.2 MG/DL (ref 8.3–10.6)
CHLORIDE SERPL-SCNC: 99 MMOL/L (ref 99–110)
CLARITY UR: CLEAR
CO2 SERPL-SCNC: 28 MMOL/L (ref 21–32)
COLOR UR: YELLOW
CREAT SERPL-MCNC: 1 MG/DL (ref 0.8–1.3)
DEPRECATED RDW RBC AUTO: 13 % (ref 12.4–15.4)
EOSINOPHIL # BLD: 0.2 K/UL (ref 0–0.6)
EOSINOPHIL NFR BLD: 2.3 %
EPI CELLS #/AREA URNS HPF: ABNORMAL /HPF (ref 0–5)
GFR SERPLBLD CREATININE-BSD FMLA CKD-EPI: 78 ML/MIN/{1.73_M2}
GLUCOSE SERPL-MCNC: 99 MG/DL (ref 70–99)
GLUCOSE UR STRIP.AUTO-MCNC: NEGATIVE MG/DL
HCT VFR BLD AUTO: 44.2 % (ref 40.5–52.5)
HGB BLD-MCNC: 15.2 G/DL (ref 13.5–17.5)
HGB UR QL STRIP.AUTO: ABNORMAL
INR PPP: 5.62 (ref 0.85–1.15)
KETONES UR STRIP.AUTO-MCNC: NEGATIVE MG/DL
LEUKOCYTE ESTERASE UR QL STRIP.AUTO: NEGATIVE
LYMPHOCYTES # BLD: 1.6 K/UL (ref 1–5.1)
LYMPHOCYTES NFR BLD: 21.6 %
MCH RBC QN AUTO: 32 PG (ref 26–34)
MCHC RBC AUTO-ENTMCNC: 34.3 G/DL (ref 31–36)
MCV RBC AUTO: 93.1 FL (ref 80–100)
MONOCYTES # BLD: 0.7 K/UL (ref 0–1.3)
MONOCYTES NFR BLD: 9.9 %
NEUTROPHILS # BLD: 4.9 K/UL (ref 1.7–7.7)
NEUTROPHILS NFR BLD: 65.2 %
NITRITE UR QL STRIP.AUTO: NEGATIVE
PH UR STRIP.AUTO: 6.5 [PH] (ref 5–8)
PLATELET # BLD AUTO: 168 K/UL (ref 135–450)
PMV BLD AUTO: 9.1 FL (ref 5–10.5)
POTASSIUM SERPL-SCNC: 3.6 MMOL/L (ref 3.5–5.1)
PROT SERPL-MCNC: 7.1 G/DL (ref 6.4–8.2)
PROT UR STRIP.AUTO-MCNC: NEGATIVE MG/DL
PROTHROMBIN TIME: 50 SEC (ref 11.9–14.9)
RBC # BLD AUTO: 4.74 M/UL (ref 4.2–5.9)
RBC #/AREA URNS HPF: ABNORMAL /HPF (ref 0–4)
SODIUM SERPL-SCNC: 134 MMOL/L (ref 136–145)
SP GR UR STRIP.AUTO: <=1.005 (ref 1–1.03)
UA COMPLETE W REFLEX CULTURE PNL UR: ABNORMAL
UA DIPSTICK W REFLEX MICRO PNL UR: YES
URN SPEC COLLECT METH UR: ABNORMAL
UROBILINOGEN UR STRIP-ACNC: 0.2 E.U./DL
WBC # BLD AUTO: 7.5 K/UL (ref 4–11)
WBC #/AREA URNS HPF: ABNORMAL /HPF (ref 0–5)

## 2024-09-22 PROCEDURE — 85610 PROTHROMBIN TIME: CPT

## 2024-09-22 PROCEDURE — 99285 EMERGENCY DEPT VISIT HI MDM: CPT

## 2024-09-22 PROCEDURE — 87491 CHLMYD TRACH DNA AMP PROBE: CPT

## 2024-09-22 PROCEDURE — 85025 COMPLETE CBC W/AUTO DIFF WBC: CPT

## 2024-09-22 PROCEDURE — 87591 N.GONORRHOEAE DNA AMP PROB: CPT

## 2024-09-22 PROCEDURE — 81001 URINALYSIS AUTO W/SCOPE: CPT

## 2024-09-22 PROCEDURE — 74177 CT ABD & PELVIS W/CONTRAST: CPT

## 2024-09-22 PROCEDURE — 85730 THROMBOPLASTIN TIME PARTIAL: CPT

## 2024-09-22 PROCEDURE — 80053 COMPREHEN METABOLIC PANEL: CPT

## 2024-09-22 PROCEDURE — 87086 URINE CULTURE/COLONY COUNT: CPT

## 2024-09-22 PROCEDURE — 76870 US EXAM SCROTUM: CPT

## 2024-09-22 PROCEDURE — 6360000004 HC RX CONTRAST MEDICATION: Performed by: STUDENT IN AN ORGANIZED HEALTH CARE EDUCATION/TRAINING PROGRAM

## 2024-09-22 PROCEDURE — 73560 X-RAY EXAM OF KNEE 1 OR 2: CPT

## 2024-09-22 RX ORDER — IOPAMIDOL 755 MG/ML
75 INJECTION, SOLUTION INTRAVASCULAR
Status: COMPLETED | OUTPATIENT
Start: 2024-09-22 | End: 2024-09-22

## 2024-09-22 RX ADMIN — IOPAMIDOL 75 ML: 755 INJECTION, SOLUTION INTRAVENOUS at 13:52

## 2024-09-22 ASSESSMENT — LIFESTYLE VARIABLES
HOW OFTEN DO YOU HAVE A DRINK CONTAINING ALCOHOL: MONTHLY OR LESS
HOW MANY STANDARD DRINKS CONTAINING ALCOHOL DO YOU HAVE ON A TYPICAL DAY: 1 OR 2

## 2024-09-22 ASSESSMENT — PAIN - FUNCTIONAL ASSESSMENT: PAIN_FUNCTIONAL_ASSESSMENT: NONE - DENIES PAIN

## 2024-09-23 LAB
BACTERIA UR CULT: NORMAL
C TRACH DNA UR QL NAA+PROBE: NEGATIVE
N GONORRHOEA DNA UR QL NAA+PROBE: NEGATIVE

## 2024-09-24 ENCOUNTER — HOSPITAL ENCOUNTER (EMERGENCY)
Age: 74
Discharge: HOME | End: 2024-09-24
Payer: COMMERCIAL

## 2024-09-24 VITALS
DIASTOLIC BLOOD PRESSURE: 73 MMHG | HEART RATE: 66 BPM | RESPIRATION RATE: 16 BRPM | TEMPERATURE: 98.2 F | SYSTOLIC BLOOD PRESSURE: 112 MMHG | OXYGEN SATURATION: 98 %

## 2024-09-24 DIAGNOSIS — N30.01 ACUTE CYSTITIS WITH HEMATURIA: Primary | ICD-10-CM

## 2024-09-24 LAB
ALBUMIN SERPL-MCNC: 4.2 G/DL (ref 3.4–5)
ALBUMIN/GLOB SERPL: 1.5 {RATIO} (ref 1.1–2.2)
ALP SERPL-CCNC: 92 U/L (ref 40–129)
ALT SERPL-CCNC: 11 U/L (ref 10–40)
ANION GAP SERPL CALCULATED.3IONS-SCNC: 5 MMOL/L (ref 3–16)
APTT BLD: 65.6 SEC (ref 22.1–36.4)
AST SERPL-CCNC: 14 U/L (ref 15–37)
BASOPHILS # BLD: 0.1 K/UL (ref 0–0.2)
BASOPHILS NFR BLD: 0.9 %
BILIRUB SERPL-MCNC: 1.5 MG/DL (ref 0–1)
BILIRUB UR QL STRIP.AUTO: ABNORMAL
BUN SERPL-MCNC: 9 MG/DL (ref 7–20)
CALCIUM SERPL-MCNC: 9 MG/DL (ref 8.3–10.6)
CHARACTER UR: ABNORMAL
CHLORIDE SERPL-SCNC: 103 MMOL/L (ref 99–110)
CLARITY UR: ABNORMAL
CO2 SERPL-SCNC: 31 MMOL/L (ref 21–32)
COLOR UR: ABNORMAL
CREAT SERPL-MCNC: 1 MG/DL (ref 0.8–1.3)
DEPRECATED RDW RBC AUTO: 13.1 % (ref 12.4–15.4)
EOSINOPHIL # BLD: 0.1 K/UL (ref 0–0.6)
EOSINOPHIL NFR BLD: 1.8 %
GFR SERPLBLD CREATININE-BSD FMLA CKD-EPI: 78 ML/MIN/{1.73_M2}
GLUCOSE SERPL-MCNC: 120 MG/DL (ref 70–99)
GLUCOSE UR STRIP.AUTO-MCNC: NEGATIVE MG/DL
HCT VFR BLD AUTO: 42.9 % (ref 40.5–52.5)
HGB BLD-MCNC: 14.5 G/DL (ref 13.5–17.5)
HGB UR QL STRIP.AUTO: ABNORMAL
INR PPP: 3.52 (ref 0.85–1.15)
KETONES UR STRIP.AUTO-MCNC: ABNORMAL MG/DL
LEUKOCYTE ESTERASE UR QL STRIP.AUTO: ABNORMAL
LYMPHOCYTES # BLD: 1.3 K/UL (ref 1–5.1)
LYMPHOCYTES NFR BLD: 21.2 %
MCH RBC QN AUTO: 31.7 PG (ref 26–34)
MCHC RBC AUTO-ENTMCNC: 33.8 G/DL (ref 31–36)
MCV RBC AUTO: 93.7 FL (ref 80–100)
MONOCYTES # BLD: 0.5 K/UL (ref 0–1.3)
MONOCYTES NFR BLD: 8.5 %
NEUTROPHILS # BLD: 4.2 K/UL (ref 1.7–7.7)
NEUTROPHILS NFR BLD: 67.6 %
NITRITE UR QL STRIP.AUTO: POSITIVE
PH UR STRIP.AUTO: 6.5 [PH] (ref 5–8)
PLATELET # BLD AUTO: 157 K/UL (ref 135–450)
PMV BLD AUTO: 9 FL (ref 5–10.5)
POTASSIUM SERPL-SCNC: 3.8 MMOL/L (ref 3.5–5.1)
PROT SERPL-MCNC: 7 G/DL (ref 6.4–8.2)
PROT UR STRIP.AUTO-MCNC: >=300 MG/DL
PROTHROMBIN TIME: 35 SEC (ref 11.9–14.9)
RBC # BLD AUTO: 4.58 M/UL (ref 4.2–5.9)
RBC #/AREA URNS HPF: >100 /HPF (ref 0–4)
SODIUM SERPL-SCNC: 139 MMOL/L (ref 136–145)
SP GR UR STRIP.AUTO: 1.02 (ref 1–1.03)
UA COMPLETE W REFLEX CULTURE PNL UR: ABNORMAL
UA DIPSTICK W REFLEX MICRO PNL UR: YES
URN SPEC COLLECT METH UR: ABNORMAL
UROBILINOGEN UR STRIP-ACNC: 2 E.U./DL
WBC # BLD AUTO: 6.2 K/UL (ref 4–11)
WBC #/AREA URNS HPF: ABNORMAL /HPF (ref 0–5)

## 2024-09-24 PROCEDURE — 6360000002 HC RX W HCPCS: Performed by: PHYSICIAN ASSISTANT

## 2024-09-24 PROCEDURE — 2580000003 HC RX 258: Performed by: PHYSICIAN ASSISTANT

## 2024-09-24 PROCEDURE — 85730 THROMBOPLASTIN TIME PARTIAL: CPT

## 2024-09-24 PROCEDURE — 80053 COMPREHEN METABOLIC PANEL: CPT

## 2024-09-24 PROCEDURE — 85025 COMPLETE CBC W/AUTO DIFF WBC: CPT

## 2024-09-24 PROCEDURE — 81001 URINALYSIS AUTO W/SCOPE: CPT

## 2024-09-24 PROCEDURE — 96365 THER/PROPH/DIAG IV INF INIT: CPT

## 2024-09-24 PROCEDURE — 36415 COLL VENOUS BLD VENIPUNCTURE: CPT

## 2024-09-24 PROCEDURE — 85610 PROTHROMBIN TIME: CPT

## 2024-09-24 PROCEDURE — 99284 EMERGENCY DEPT VISIT MOD MDM: CPT

## 2024-09-24 RX ORDER — CEPHALEXIN 500 MG/1
500 CAPSULE ORAL 4 TIMES DAILY
Qty: 28 CAPSULE | Refills: 0 | Status: SHIPPED | OUTPATIENT
Start: 2024-09-24 | End: 2024-10-01

## 2024-09-24 RX ADMIN — CEFTRIAXONE SODIUM 1000 MG: 1 INJECTION, POWDER, FOR SOLUTION INTRAMUSCULAR; INTRAVENOUS at 12:51

## 2024-09-24 ASSESSMENT — ENCOUNTER SYMPTOMS
EYE REDNESS: 0
ABDOMINAL PAIN: 0
CONSTIPATION: 0
COUGH: 0
SINUS PAIN: 0
EYE DISCHARGE: 0
VOMITING: 0
RHINORRHEA: 0
CHEST TIGHTNESS: 0
DIARRHEA: 0
SHORTNESS OF BREATH: 0
SINUS PRESSURE: 0
NAUSEA: 0
SORE THROAT: 0

## 2024-09-24 ASSESSMENT — PAIN SCALES - GENERAL: PAINLEVEL_OUTOF10: 1

## 2024-09-24 ASSESSMENT — PAIN - FUNCTIONAL ASSESSMENT: PAIN_FUNCTIONAL_ASSESSMENT: 0-10

## (undated) DEVICE — GLOVE SURG SZ 8 L12IN FNGR THK94MIL STD WHT LTX FREE

## (undated) DEVICE — SUTURE COAT VCRL SZ 4-0 L18IN ABSRB UD L19MM PS-2 1/2 CIR J496G

## (undated) DEVICE — MINOR SET UP PK

## (undated) DEVICE — CHLORAPREP 26ML ORANGE

## (undated) DEVICE — GLOVE,SURG,SENSICARE,ALOE,LF,PF,7: Brand: MEDLINE

## (undated) DEVICE — 3M™ TEGADERM™ TRANSPARENT FILM DRESSING FRAME STYLE, 1626W, 4 IN X 4-3/4 IN (10 CM X 12 CM), 50/CT 4CT/CASE: Brand: 3M™ TEGADERM™

## (undated) DEVICE — 3M™ STERI-STRIP™ REINFORCED ADHESIVE SKIN CLOSURES, R1547, 1/2 IN X 4 IN (12 MM X 100 MM), 6 STRIPS/ENVELOPE: Brand: 3M™ STERI-STRIP™

## (undated) DEVICE — GLOVE SURG SZ 75 L12IN FNGR THK94MIL STD WHT LTX FREE

## (undated) DEVICE — GLOVE SURG SZ 85 L12IN FNGR THK94MIL STD WHT LTX FREE

## (undated) DEVICE — MEDI-VAC NON-CONDUCTIVE SUCTION TUBING: Brand: CARDINAL HEALTH

## (undated) DEVICE — SUTURE VCRL SZ 3-0 L18IN ABSRB UD L26MM SH 1/2 CIR J864D

## (undated) DEVICE — GAUZE,SPONGE,2"X2",8PLY,STERILE,LF,2'S: Brand: MEDLINE

## (undated) DEVICE — SOLUTION IV IRRIG 500ML 0.9% SODIUM CHL 2F7123

## (undated) DEVICE — 3M™ TEGADERM™ TRANSPARENT FILM DRESSING FRAME STYLE, 1624W, 2-3/8 IN X 2-3/4 IN (6 CM X 7 CM), 100/CT 4CT/CASE: Brand: 3M™ TEGADERM™

## (undated) DEVICE — GAUZE SPONGES,8 PLY: Brand: CURITY

## (undated) DEVICE — SUTURE ETHLN SZ 4-0 L18IN NONABSORBABLE BLK L19MM PS-2 3/8 1667H

## (undated) DEVICE — ELECTRODE PT RET AD L9FT HI MOIST COND ADH HYDRGEL CORDED

## (undated) DEVICE — 3M™ STERI-STRIP™ COMPOUND BENZOIN TINCTURE 40 BAGS/CARTON 4 CARTONS/CASE C1544: Brand: 3M™ STERI-STRIP™

## (undated) DEVICE — 10FR FRAZIER SUCTION HANDLE: Brand: CARDINAL HEALTH

## (undated) DEVICE — BLADE CLIPPER GEN PURP NS

## (undated) DEVICE — SUTURE VCRL SZ 4-0 L18IN ABSRB UD L19MM PS-2 3/8 CIR PRIM J496H

## (undated) DEVICE — SURGICAL PROCEDURE PACK IV U-BAR

## (undated) DEVICE — PENCIL ES L3M BTTN SWCH S STL HEX LOK BLDE ELECTRD HOLSTER

## (undated) DEVICE — ELECTRODE,ECG,STRESS,FOAM,3PK: Brand: MEDLINE